# Patient Record
Sex: MALE | Race: WHITE | Employment: UNEMPLOYED | URBAN - NONMETROPOLITAN AREA
[De-identification: names, ages, dates, MRNs, and addresses within clinical notes are randomized per-mention and may not be internally consistent; named-entity substitution may affect disease eponyms.]

---

## 2022-08-31 RX ORDER — GABAPENTIN 100 MG/1
100 CAPSULE ORAL 3 TIMES DAILY
COMMUNITY

## 2022-08-31 RX ORDER — TRAMADOL HYDROCHLORIDE 50 MG/1
50 TABLET ORAL EVERY 6 HOURS PRN
Status: ON HOLD | COMMUNITY
End: 2022-09-09 | Stop reason: HOSPADM

## 2022-08-31 NOTE — FLOWSHEET NOTE
Follow all instructions given by your physician    NPO after midnight   Sips of water am of surgery with allowed medications  Bring insurance info and 's license  Wear comfortable clean, loose fitting clothing  No jewelry or contact lenses to be worn day of surgery  No glue on dentures morning of surgery;you will be asked to remove them for surgery. Case for glasses. Shower night before and morning of surgery with a liquid antibacterial soap, dry with fresh clean towel; no lotions, creams or powder. Clean sheets and pillow case on bed night before surgery  Bring medications in original bottles     needed at discharge and someone over 18 to stay with you for 24 hours overnight (surgery may be cancelled if you don't have this)  [facility] on 2nd [facility]  If you would become ill prior to surgery, please call the surgeon  May have a visitor with you, we request that you limit to 2 visitors in pre-op area  Please bring and wear mask  Call [phone] for any questions  Covid questionnaire Complete; Patient negative for symptoms or exposure. See documentation.

## 2022-09-01 ENCOUNTER — HOSPITAL ENCOUNTER (OUTPATIENT)
Age: 47
Discharge: HOME | End: 2022-09-01
Payer: MEDICAID

## 2022-09-01 ENCOUNTER — HOSPITAL ENCOUNTER (OUTPATIENT)
Dept: GENERAL RADIOLOGY | Age: 47
Discharge: HOME | End: 2022-09-01
Payer: MEDICAID

## 2022-09-01 DIAGNOSIS — Z01.811 PRE-OP CHEST EXAM: ICD-10-CM

## 2022-09-01 LAB
ANION GAP SERPL CALCULATED.3IONS-SCNC: 10 MEQ/L (ref 8–16)
APTT: 33.5 SECONDS (ref 22–38)
BUN BLDV-MCNC: 21 MG/DL (ref 7–22)
CALCIUM SERPL-MCNC: 9 MG/DL (ref 8.5–10.5)
CHLORIDE BLD-SCNC: 108 MEQ/L (ref 98–111)
CO2: 25 MEQ/L (ref 23–33)
CREAT SERPL-MCNC: 0.7 MG/DL (ref 0.4–1.2)
EKG ATRIAL RATE: 71 BPM
EKG P AXIS: 50 DEGREES
EKG P-R INTERVAL: 182 MS
EKG Q-T INTERVAL: 422 MS
EKG QRS DURATION: 96 MS
EKG QTC CALCULATION (BAZETT): 458 MS
EKG R AXIS: 9 DEGREES
EKG T AXIS: 33 DEGREES
EKG VENTRICULAR RATE: 71 BPM
ERYTHROCYTE [DISTWIDTH] IN BLOOD BY AUTOMATED COUNT: 12.9 % (ref 11.5–14.5)
ERYTHROCYTE [DISTWIDTH] IN BLOOD BY AUTOMATED COUNT: 44.9 FL (ref 35–45)
GFR SERPL CREATININE-BSD FRML MDRD: > 90 ML/MIN/1.73M2
GLUCOSE BLD-MCNC: 102 MG/DL (ref 70–108)
HCT VFR BLD CALC: 48.6 % (ref 42–52)
HEMOGLOBIN: 16.4 GM/DL (ref 14–18)
INR BLD: 0.86 (ref 0.85–1.13)
MCH RBC QN AUTO: 31.8 PG (ref 26–33)
MCHC RBC AUTO-ENTMCNC: 33.7 GM/DL (ref 32.2–35.5)
MCV RBC AUTO: 94.4 FL (ref 80–94)
MRSA NASAL SCREEN RT-PCR: NEGATIVE
PLATELET # BLD: 239 THOU/MM3 (ref 130–400)
PMV BLD AUTO: 9.4 FL (ref 9.4–12.4)
POTASSIUM SERPL-SCNC: 4.8 MEQ/L (ref 3.5–5.2)
RBC # BLD: 5.15 MILL/MM3 (ref 4.7–6.1)
SODIUM BLD-SCNC: 143 MEQ/L (ref 135–145)
STAPH AUREUS SCREEN RT-PCR: NEGATIVE
WBC # BLD: 6.5 THOU/MM3 (ref 4.8–10.8)

## 2022-09-01 PROCEDURE — 85730 THROMBOPLASTIN TIME PARTIAL: CPT

## 2022-09-01 PROCEDURE — 93010 ELECTROCARDIOGRAM REPORT: CPT | Performed by: INTERNAL MEDICINE

## 2022-09-01 PROCEDURE — 87641 MR-STAPH DNA AMP PROBE: CPT

## 2022-09-01 PROCEDURE — 85610 PROTHROMBIN TIME: CPT

## 2022-09-01 PROCEDURE — 36415 COLL VENOUS BLD VENIPUNCTURE: CPT

## 2022-09-01 PROCEDURE — 85027 COMPLETE CBC AUTOMATED: CPT

## 2022-09-01 PROCEDURE — 93005 ELECTROCARDIOGRAM TRACING: CPT | Performed by: ORTHOPAEDIC SURGERY

## 2022-09-01 PROCEDURE — 71046 X-RAY EXAM CHEST 2 VIEWS: CPT

## 2022-09-01 PROCEDURE — 87640 STAPH A DNA AMP PROBE: CPT

## 2022-09-01 PROCEDURE — 80048 BASIC METABOLIC PNL TOTAL CA: CPT

## 2022-09-08 NOTE — H&P
palpitations  GASTROINTESTINAL:  negative for nausea, vomiting, incontinence  GENITOURINARY:  negative for frequency and urinary incontinence  MUSCULOSKELETAL:  (+) for back pain, leg pain  NEUROLOGICAL:  (+) for numbness/tingling, negative for headaches  BEHAVIOR/PSYCH:  negative for depressed mood, increased anxiety    PHYSICAL EXAM:    VITAL SIGNS: Ht 6' in, Wt 210 lbs, BMI 28.48.  CONSTITUTIONAL:  Awake, alert, cooperative, no apparent distress, and appears stated age. Antalgic giat  HEAD: Atraumatic, normocephalic  LUNGS:  No respiratory distress. CTA bilaterally. No wheezes, rales, rhonchi  CARDIOVASCULAR:  Regular rate and rhythm, no murmur  ABDOMEN:  Normal bowel sounds, soft, non-distended, non-tender  SPINE: Limited lumbar ROM. Inspection of the spine shows no skin lesions or open wounds. TTP lumbar spine. MUSCULOSKELETAL:  There is no redness, warmth, or swelling of the joints. Full range of motion noted. Motor strength is 5 out of 5 bilateral LE.    NEUROLOGIC:  Awake, alert, oriented to name, place and time. Sensory is intact bilateral LE except for decreased in the left foot.  (+) SLR left side    DATA:    CBC:   Lab Results   Component Value Date/Time    WBC 6.5 09/01/2022 08:41 AM    RBC 5.15 09/01/2022 08:41 AM    HGB 16.4 09/01/2022 08:41 AM    HCT 48.6 09/01/2022 08:41 AM    MCV 94.4 09/01/2022 08:41 AM    MCH 31.8 09/01/2022 08:41 AM    MCHC 33.7 09/01/2022 08:41 AM     09/01/2022 08:41 AM    MPV 9.4 09/01/2022 08:41 AM     WBC:    Lab Results   Component Value Date/Time    WBC 6.5 09/01/2022 08:41 AM     Hemoglobin/Hematocrit:    Lab Results   Component Value Date/Time    HGB 16.4 09/01/2022 08:41 AM    HCT 48.6 09/01/2022 08:41 AM     CMP:    Lab Results   Component Value Date/Time     09/01/2022 08:41 AM    K 4.8 09/01/2022 08:41 AM     09/01/2022 08:41 AM    CO2 25 09/01/2022 08:41 AM    BUN 21 09/01/2022 08:41 AM    CREATININE 0.7 09/01/2022 08:41 AM    LABGLOM >90

## 2022-09-09 ENCOUNTER — APPOINTMENT (OUTPATIENT)
Dept: GENERAL RADIOLOGY | Age: 47
End: 2022-09-09
Attending: ORTHOPAEDIC SURGERY
Payer: MEDICAID

## 2022-09-09 ENCOUNTER — ANESTHESIA EVENT (OUTPATIENT)
Dept: OPERATING ROOM | Age: 47
End: 2022-09-09
Payer: MEDICAID

## 2022-09-09 ENCOUNTER — HOSPITAL ENCOUNTER (OUTPATIENT)
Age: 47
Setting detail: OBSERVATION
Discharge: HOME | End: 2022-09-09
Attending: ORTHOPAEDIC SURGERY | Admitting: ORTHOPAEDIC SURGERY
Payer: MEDICAID

## 2022-09-09 ENCOUNTER — ANESTHESIA (OUTPATIENT)
Dept: OPERATING ROOM | Age: 47
End: 2022-09-09
Payer: MEDICAID

## 2022-09-09 VITALS
RESPIRATION RATE: 18 BRPM | DIASTOLIC BLOOD PRESSURE: 74 MMHG | WEIGHT: 213 LBS | TEMPERATURE: 97.9 F | BODY MASS INDEX: 28.85 KG/M2 | SYSTOLIC BLOOD PRESSURE: 113 MMHG | HEIGHT: 72 IN | OXYGEN SATURATION: 95 % | HEART RATE: 81 BPM

## 2022-09-09 PROBLEM — M48.062 LUMBAR STENOSIS WITH NEUROGENIC CLAUDICATION: Status: ACTIVE | Noted: 2022-09-09

## 2022-09-09 LAB
ABO: NORMAL
ANTIBODY SCREEN: NORMAL
RH FACTOR: NORMAL

## 2022-09-09 PROCEDURE — 3600000004 HC SURGERY LEVEL 4 BASE: Performed by: ORTHOPAEDIC SURGERY

## 2022-09-09 PROCEDURE — 86901 BLOOD TYPING SEROLOGIC RH(D): CPT

## 2022-09-09 PROCEDURE — 7100000010 HC PHASE II RECOVERY - FIRST 15 MIN: Performed by: ORTHOPAEDIC SURGERY

## 2022-09-09 PROCEDURE — 7100000001 HC PACU RECOVERY - ADDTL 15 MIN: Performed by: ORTHOPAEDIC SURGERY

## 2022-09-09 PROCEDURE — 2580000003 HC RX 258: Performed by: PHYSICIAN ASSISTANT

## 2022-09-09 PROCEDURE — 3600000014 HC SURGERY LEVEL 4 ADDTL 15MIN: Performed by: ORTHOPAEDIC SURGERY

## 2022-09-09 PROCEDURE — 2709999900 HC NON-CHARGEABLE SUPPLY: Performed by: ORTHOPAEDIC SURGERY

## 2022-09-09 PROCEDURE — 7100000011 HC PHASE II RECOVERY - ADDTL 15 MIN: Performed by: ORTHOPAEDIC SURGERY

## 2022-09-09 PROCEDURE — 36415 COLL VENOUS BLD VENIPUNCTURE: CPT

## 2022-09-09 PROCEDURE — 6360000002 HC RX W HCPCS: Performed by: ORTHOPAEDIC SURGERY

## 2022-09-09 PROCEDURE — 2500000003 HC RX 250 WO HCPCS

## 2022-09-09 PROCEDURE — 86850 RBC ANTIBODY SCREEN: CPT

## 2022-09-09 PROCEDURE — 6360000002 HC RX W HCPCS: Performed by: PHYSICIAN ASSISTANT

## 2022-09-09 PROCEDURE — 3700000001 HC ADD 15 MINUTES (ANESTHESIA): Performed by: ORTHOPAEDIC SURGERY

## 2022-09-09 PROCEDURE — 7100000000 HC PACU RECOVERY - FIRST 15 MIN: Performed by: ORTHOPAEDIC SURGERY

## 2022-09-09 PROCEDURE — 86900 BLOOD TYPING SEROLOGIC ABO: CPT

## 2022-09-09 PROCEDURE — 6360000002 HC RX W HCPCS

## 2022-09-09 PROCEDURE — G0378 HOSPITAL OBSERVATION PER HR: HCPCS

## 2022-09-09 PROCEDURE — 3700000000 HC ANESTHESIA ATTENDED CARE: Performed by: ORTHOPAEDIC SURGERY

## 2022-09-09 PROCEDURE — 2500000003 HC RX 250 WO HCPCS: Performed by: ORTHOPAEDIC SURGERY

## 2022-09-09 PROCEDURE — 72020 X-RAY EXAM OF SPINE 1 VIEW: CPT

## 2022-09-09 PROCEDURE — 2720000010 HC SURG SUPPLY STERILE: Performed by: ORTHOPAEDIC SURGERY

## 2022-09-09 PROCEDURE — 6370000000 HC RX 637 (ALT 250 FOR IP): Performed by: PHYSICIAN ASSISTANT

## 2022-09-09 RX ORDER — FENTANYL CITRATE 50 UG/ML
50 INJECTION, SOLUTION INTRAMUSCULAR; INTRAVENOUS EVERY 5 MIN PRN
Status: DISCONTINUED | OUTPATIENT
Start: 2022-09-09 | End: 2022-09-09 | Stop reason: HOSPADM

## 2022-09-09 RX ORDER — VANCOMYCIN HYDROCHLORIDE 1 G/20ML
INJECTION, POWDER, LYOPHILIZED, FOR SOLUTION INTRAVENOUS PRN
Status: DISCONTINUED | OUTPATIENT
Start: 2022-09-09 | End: 2022-09-09 | Stop reason: ALTCHOICE

## 2022-09-09 RX ORDER — SODIUM CHLORIDE 9 MG/ML
INJECTION, SOLUTION INTRAVENOUS PRN
Status: DISCONTINUED | OUTPATIENT
Start: 2022-09-09 | End: 2022-09-09 | Stop reason: HOSPADM

## 2022-09-09 RX ORDER — LABETALOL 20 MG/4 ML (5 MG/ML) INTRAVENOUS SYRINGE
10
Status: DISCONTINUED | OUTPATIENT
Start: 2022-09-09 | End: 2022-09-09 | Stop reason: HOSPADM

## 2022-09-09 RX ORDER — ONDANSETRON 2 MG/ML
INJECTION INTRAMUSCULAR; INTRAVENOUS PRN
Status: DISCONTINUED | OUTPATIENT
Start: 2022-09-09 | End: 2022-09-09 | Stop reason: SDUPTHER

## 2022-09-09 RX ORDER — FENTANYL CITRATE 50 UG/ML
INJECTION, SOLUTION INTRAMUSCULAR; INTRAVENOUS PRN
Status: DISCONTINUED | OUTPATIENT
Start: 2022-09-09 | End: 2022-09-09 | Stop reason: SDUPTHER

## 2022-09-09 RX ORDER — MIDAZOLAM HYDROCHLORIDE 1 MG/ML
INJECTION INTRAMUSCULAR; INTRAVENOUS PRN
Status: DISCONTINUED | OUTPATIENT
Start: 2022-09-09 | End: 2022-09-09 | Stop reason: SDUPTHER

## 2022-09-09 RX ORDER — BUPIVACAINE HYDROCHLORIDE 5 MG/ML
INJECTION, SOLUTION PERINEURAL PRN
Status: DISCONTINUED | OUTPATIENT
Start: 2022-09-09 | End: 2022-09-09 | Stop reason: ALTCHOICE

## 2022-09-09 RX ORDER — SODIUM CHLORIDE 0.9 % (FLUSH) 0.9 %
5-40 SYRINGE (ML) INJECTION PRN
Status: DISCONTINUED | OUTPATIENT
Start: 2022-09-09 | End: 2022-09-09 | Stop reason: HOSPADM

## 2022-09-09 RX ORDER — OXYCODONE HYDROCHLORIDE AND ACETAMINOPHEN 5; 325 MG/1; MG/1
1 TABLET ORAL ONCE
Status: COMPLETED | OUTPATIENT
Start: 2022-09-09 | End: 2022-09-09

## 2022-09-09 RX ORDER — ROCURONIUM BROMIDE 10 MG/ML
INJECTION, SOLUTION INTRAVENOUS PRN
Status: DISCONTINUED | OUTPATIENT
Start: 2022-09-09 | End: 2022-09-09 | Stop reason: SDUPTHER

## 2022-09-09 RX ORDER — SODIUM CHLORIDE 0.9 % (FLUSH) 0.9 %
5-40 SYRINGE (ML) INJECTION EVERY 12 HOURS SCHEDULED
Status: DISCONTINUED | OUTPATIENT
Start: 2022-09-09 | End: 2022-09-09 | Stop reason: HOSPADM

## 2022-09-09 RX ORDER — DEXAMETHASONE SODIUM PHOSPHATE 10 MG/ML
INJECTION, EMULSION INTRAMUSCULAR; INTRAVENOUS PRN
Status: DISCONTINUED | OUTPATIENT
Start: 2022-09-09 | End: 2022-09-09 | Stop reason: SDUPTHER

## 2022-09-09 RX ORDER — SODIUM CHLORIDE 9 MG/ML
INJECTION, SOLUTION INTRAVENOUS CONTINUOUS
Status: DISCONTINUED | OUTPATIENT
Start: 2022-09-09 | End: 2022-09-09 | Stop reason: HOSPADM

## 2022-09-09 RX ORDER — SUCCINYLCHOLINE CHLORIDE 20 MG/ML
INJECTION INTRAMUSCULAR; INTRAVENOUS PRN
Status: DISCONTINUED | OUTPATIENT
Start: 2022-09-09 | End: 2022-09-09 | Stop reason: SDUPTHER

## 2022-09-09 RX ORDER — LIDOCAINE HYDROCHLORIDE 20 MG/ML
INJECTION, SOLUTION EPIDURAL; INFILTRATION; INTRACAUDAL; PERINEURAL PRN
Status: DISCONTINUED | OUTPATIENT
Start: 2022-09-09 | End: 2022-09-09 | Stop reason: SDUPTHER

## 2022-09-09 RX ORDER — HYDRALAZINE HYDROCHLORIDE 20 MG/ML
10 INJECTION INTRAMUSCULAR; INTRAVENOUS
Status: DISCONTINUED | OUTPATIENT
Start: 2022-09-09 | End: 2022-09-09 | Stop reason: HOSPADM

## 2022-09-09 RX ORDER — PROPOFOL 10 MG/ML
INJECTION, EMULSION INTRAVENOUS PRN
Status: DISCONTINUED | OUTPATIENT
Start: 2022-09-09 | End: 2022-09-09 | Stop reason: SDUPTHER

## 2022-09-09 RX ORDER — FENTANYL CITRATE 50 UG/ML
INJECTION, SOLUTION INTRAMUSCULAR; INTRAVENOUS
Status: COMPLETED
Start: 2022-09-09 | End: 2022-09-09

## 2022-09-09 RX ADMIN — SODIUM CHLORIDE: 9 INJECTION, SOLUTION INTRAVENOUS at 07:04

## 2022-09-09 RX ADMIN — SUGAMMADEX 200 MG: 100 INJECTION, SOLUTION INTRAVENOUS at 10:08

## 2022-09-09 RX ADMIN — FENTANYL CITRATE 100 MCG: 50 INJECTION, SOLUTION INTRAMUSCULAR; INTRAVENOUS at 08:46

## 2022-09-09 RX ADMIN — FENTANYL CITRATE 50 MCG: 50 INJECTION, SOLUTION INTRAMUSCULAR; INTRAVENOUS at 10:38

## 2022-09-09 RX ADMIN — ONDANSETRON 4 MG: 2 INJECTION INTRAMUSCULAR; INTRAVENOUS at 09:55

## 2022-09-09 RX ADMIN — SODIUM CHLORIDE: 9 INJECTION, SOLUTION INTRAVENOUS at 09:48

## 2022-09-09 RX ADMIN — OXYCODONE AND ACETAMINOPHEN 1 TABLET: 5; 325 TABLET ORAL at 12:05

## 2022-09-09 RX ADMIN — ROCURONIUM BROMIDE 30 MG: 10 INJECTION INTRAVENOUS at 09:01

## 2022-09-09 RX ADMIN — MIDAZOLAM 2 MG: 1 INJECTION INTRAMUSCULAR; INTRAVENOUS at 08:44

## 2022-09-09 RX ADMIN — ROCURONIUM BROMIDE 20 MG: 10 INJECTION INTRAVENOUS at 09:20

## 2022-09-09 RX ADMIN — DEXAMETHASONE SODIUM PHOSPHATE 10 MG: 10 INJECTION, EMULSION INTRAMUSCULAR; INTRAVENOUS at 08:52

## 2022-09-09 RX ADMIN — FENTANYL CITRATE 50 MCG: 50 INJECTION, SOLUTION INTRAMUSCULAR; INTRAVENOUS at 10:08

## 2022-09-09 RX ADMIN — PROPOFOL 170 MG: 10 INJECTION, EMULSION INTRAVENOUS at 08:49

## 2022-09-09 RX ADMIN — CEFAZOLIN 2000 MG: 10 INJECTION, POWDER, FOR SOLUTION INTRAVENOUS at 09:01

## 2022-09-09 RX ADMIN — Medication 140 MG: at 08:49

## 2022-09-09 RX ADMIN — LIDOCAINE HYDROCHLORIDE 100 MG: 20 INJECTION, SOLUTION EPIDURAL; INFILTRATION; INTRACAUDAL; PERINEURAL at 08:49

## 2022-09-09 ASSESSMENT — PAIN DESCRIPTION - PAIN TYPE
TYPE: SURGICAL PAIN

## 2022-09-09 ASSESSMENT — PAIN DESCRIPTION - DESCRIPTORS: DESCRIPTORS: SHARP

## 2022-09-09 ASSESSMENT — PAIN SCALES - GENERAL
PAINLEVEL_OUTOF10: 7
PAINLEVEL_OUTOF10: 0
PAINLEVEL_OUTOF10: 5
PAINLEVEL_OUTOF10: 5

## 2022-09-09 ASSESSMENT — PAIN DESCRIPTION - LOCATION
LOCATION: BACK

## 2022-09-09 ASSESSMENT — PAIN DESCRIPTION - ONSET: ONSET: ON-GOING

## 2022-09-09 ASSESSMENT — PAIN - FUNCTIONAL ASSESSMENT
PAIN_FUNCTIONAL_ASSESSMENT: ACTIVITIES ARE NOT PREVENTED
PAIN_FUNCTIONAL_ASSESSMENT: 0-10

## 2022-09-09 ASSESSMENT — LIFESTYLE VARIABLES: SMOKING_STATUS: 1

## 2022-09-09 ASSESSMENT — PAIN DESCRIPTION - ORIENTATION: ORIENTATION: LOWER

## 2022-09-09 ASSESSMENT — PAIN DESCRIPTION - FREQUENCY: FREQUENCY: CONTINUOUS

## 2022-09-09 NOTE — ANESTHESIA PRE PROCEDURE
Department of Anesthesiology  Preprocedure Note       Name:  Pamella Redman   Age:  55 y.o.  :  1975                                          MRN:  [id number]         Date:  2022      Surgeon: Minh Kumari):  Enzo Everett MD    Procedure: Procedure(s):  L5-S1 DECOMPRESSION AND LEFT MICRODISCECTOMY    Medications prior to admission:   Prior to Admission medications    Medication Sig Start Date End Date Taking? Authorizing Provider   gabapentin (NEURONTIN) 100 MG capsule Take 100 mg by mouth 3 times daily. Yes Historical Provider, MD   traMADol (ULTRAM) 50 MG tablet Take 50 mg by mouth every 6 hours as needed for Pain.    Yes Historical Provider, MD       Current medications:    Current Facility-Administered Medications   Medication Dose Route Frequency Provider Last Rate Last Admin    0.9 % sodium chloride infusion   IntraVENous Continuous Arun Diglio,  mL/hr at 22 0704 Restarted at 22 0844    sodium chloride flush 0.9 % injection 5-40 mL  5-40 mL IntraVENous 2 times per day [place], PA        sodium chloride flush 0.9 % injection 5-40 mL  5-40 mL IntraVENous PRN Arun Diglio, PA        0.9 % sodium chloride infusion   IntraVENous PRN [place], PA         Facility-Administered Medications Ordered in Other Encounters   Medication Dose Route Frequency Provider Last Rate Last Admin    dexamethasone (PF) (DECADRON) injection   IntraVENous PRN Lenice Chime, APRN - CRNA   10 mg at 22 6436    propofol injection   IntraVENous PRN Lenice Chime, APRN - CRNA   170 mg at 22 0849    rocuronium (ZEMURON) injection   IntraVENous PRN Lenice Chime, APRN - CRNA   30 mg at 22 0901    succinylcholine (ANECTINE) injection   IntraVENous PRN Lenice Chime, APRN - CRNA   140 mg at 22 0849    lidocaine PF 2 % injection   IntraVENous PRN Lenice Chime, APRN - CRNA   100 mg at 22 0849    fentaNYL (SUBLIMAZE) injection   IntraVENous PRN Nick Granger Ebenezer Leroy - CRNA   100 mcg at 09/09/22 0846    midazolam (VERSED) injection   IntraVENous PRN Serene Dus, APRN - CRNA   2 mg at 09/09/22 0844       Allergies:  No Known Allergies    Problem List:    Patient Active Problem List   Diagnosis Code    Lumbar stenosis with neurogenic claudication M48.062       Past Medical History:  History reviewed. No pertinent past medical history. Past Surgical History:  History reviewed. No pertinent surgical history. Social History:    Social History     Tobacco Use    Smoking status: Every Day     Packs/day: 2.00     Types: Cigarettes    Smokeless tobacco: Never   Substance Use Topics    Alcohol use: Not Currently                                Ready to quit: Not Answered  Counseling given: Not Answered      Vital Signs (Current):   Vitals:    08/31/22 1306 09/09/22 0608   BP:  126/71   Pulse:  77   Resp:  20   Temp:  97.3 °F (36.3 °C)   TempSrc:  Tympanic   SpO2:  96%   Weight: 210 lb (95.3 kg) 213 lb (96.6 kg)   Height: 6' (1.829 m)                                               BP Readings from Last 3 Encounters:   09/09/22 126/71       NPO Status: Time of last liquid consumption: 1900                        Time of last solid consumption: 1900                        Date of last liquid consumption: 09/08/22                        Date of last solid food consumption: 09/08/22    BMI:   Wt Readings from Last 3 Encounters:   09/09/22 213 lb (96.6 kg)     Body mass index is 28.89 kg/m².     CBC:   Lab Results   Component Value Date/Time    WBC 6.5 09/01/2022 08:41 AM    RBC 5.15 09/01/2022 08:41 AM    HGB 16.4 09/01/2022 08:41 AM    HCT 48.6 09/01/2022 08:41 AM    MCV 94.4 09/01/2022 08:41 AM     09/01/2022 08:41 AM       CMP:   Lab Results   Component Value Date/Time     09/01/2022 08:41 AM    K 4.8 09/01/2022 08:41 AM     09/01/2022 08:41 AM    CO2 25 09/01/2022 08:41 AM    BUN 21 09/01/2022 08:41 AM    CREATININE 0.7 09/01/2022 08:41 AM LABGLOM >90 09/01/2022 08:41 AM    GLUCOSE 102 09/01/2022 08:41 AM    CALCIUM 9.0 09/01/2022 08:41 AM       POC Tests: No results for input(s): POCGLU, POCNA, POCK, POCCL, POCBUN, POCHEMO, POCHCT in the last 72 hours. Coags:   Lab Results   Component Value Date/Time    INR 0.86 09/01/2022 08:41 AM    APTT 33.5 09/01/2022 08:41 AM       HCG (If Applicable): No results found for: PREGTESTUR, PREGSERUM, HCG, HCGQUANT     ABGs: No results found for: PHART, PO2ART, LQA7NWO, HEP0MSZ, BEART, B6ZMJXSM     Type & Screen (If Applicable):  Lab Results   Component Value Date    LABRH POS 09/09/2022       Drug/Infectious Status (If Applicable):  No results found for: HIV, HEPCAB    COVID-19 Screening (If Applicable): No results found for: COVID19        Anesthesia Evaluation  Patient summary reviewed  Airway: Mallampati: II  TM distance: >3 FB   Neck ROM: full  Mouth opening: > = 3 FB   Dental:    (+) poor dentition      Pulmonary:   (+) current smoker          Patient smoked on day of surgery. Cardiovascular:                      Neuro/Psych:               GI/Hepatic/Renal:             Endo/Other:                     Abdominal:             Vascular: Other Findings:           Anesthesia Plan      general     ASA 2       Induction: intravenous. MIPS: Postoperative opioids intended and Prophylactic antiemetics administered. Anesthetic plan and risks discussed with patient and spouse. Plan discussed with CRNA. Xin Art.  [facility]   9/9/2022

## 2022-09-09 NOTE — DISCHARGE INSTRUCTIONS
For the next 2 weeks, limit bending and twisting of the spine and do not lift > 10-15 lbs. After  2 weeks, gradually ease off restrictions.

## 2022-09-09 NOTE — PROGRESS NOTES
Pt returned to [facility] room 6. Vitals and assessment as charted. 0.9 infusing from PACU. Pt has crackers and water. Family at the bedside. Pt and family verbalized understanding of discharge criteria and call light use. Call light in reach.

## 2022-09-09 NOTE — PROGRESS NOTES
Pt has met discharge criteria and states he is ready for discharge to home. IV removed, gauze and tape applied. Dressed in own clothes and personal belongings gathered. Discharge instructions (with opioid medication education information) given to pt and family; pt and family verbalized understanding of discharge instructions, prescriptions and follow up appointments. Pt transported to discharge lobby by [facility] staff.

## 2022-09-09 NOTE — PROGRESS NOTES
1019 Patient to PACU  Alert to name. Respirations easy and unlabored. Denies having any pain at this time. 1030 Patient alert and oriented x4. States pain is uncomfortable, but tolerable. VSS. 1038 Patient stating pain 7/10 at this time, medicated with 50 mcg Fentanyl. 1043 Patient stating pain more tolerable at this time. Respirations easy and unlabored. VSS. 1050 Patient resting with eyes closed. VSS. 1054 Patient states pain tolerable at this time. VSS    1058 Patient meets criteria for discharge from PACU.      1100 Patient transported to [facility] in stable condition.       1105 Report given to [facility]

## 2022-09-09 NOTE — BRIEF OP NOTE
Brief Postoperative Note      Patient: Latesha Newby  YOB: 1975  MRN: [id number]    Date of Procedure: 9/9/2022    Pre-Op Diagnosis: L5-S1 disc herniation with stenosis and radiculopathy    Post-Op Diagnosis: Same       Procedure: L5-S1 left microdiscectomy    Surgeon(s):  Jeanne Souza MD    Assistant:  Physician Assistant: Reginald Fernandez.  EUGENIO Morgan    Anesthesia: General    Estimated Blood Loss (mL): 50 ml    Complications: None    Specimens:   * No specimens in log *    Implants:  * No implants in log *      Drains: * No LDAs found *    Findings: leftward disc herniation    Electronically signed by Americo Terrazas PA-C on 9/9/2022 at 9:54 AM

## 2022-09-09 NOTE — ANESTHESIA POSTPROCEDURE EVALUATION
Department of Anesthesiology  Postprocedure Note    Patient: Acosta Darby  MRN: [id number]  YOB: 1975  Date of evaluation: 9/9/2022      Procedure Summary     Date: 09/09/22 Room / Location: [facility]  Shelby Catalan    Anesthesia Start: 0092 Anesthesia Stop: 0999    Procedure: L5-S1 DECOMPRESSION AND LEFT MICRODISCECTOMY (Spine Lumbar) Diagnosis:       Displacement of intervertebral disc of lumbosacral region      (Displacement of intervertebral disc of lumbosacral region [M51.27])    Surgeons: Juliet Diallo MD Responsible Provider: Diane Catalan DO    Anesthesia Type: general ASA Status: 2          Anesthesia Type: No value filed.     Shani Phase I: Shani Score: 10    Shani Phase II: Shani Score: 10      Anesthesia Post Evaluation    Patient location during evaluation: bedside  Patient participation: complete - patient participated  Level of consciousness: awake  Airway patency: patent  Nausea & Vomiting: no vomiting and no nausea  Cardiovascular status: hemodynamically stable  Respiratory status: acceptable  Hydration status: stable

## 2022-09-09 NOTE — PROGRESS NOTES
Pt admitted to [facility] room 6 and oriented to unit. SCD sleeves applied. Nares swabbed. Pt verbalized permission for first name, last initial and physicians name on white board. SDS board and discharge criteria explained, pt and family verbalized understanding. Pt denies thoughts of harming self or others. Call light in reach. Family at the bedside.

## 2022-09-12 NOTE — DISCHARGE SUMMARY
Discharge Summary        Date of Admission: 9/9/22  Date of Discharge: 9/9/22    Admitting Physician: Roque Grace MD  Consults: None     DATE OF PROCEDURE: 09/09/2022     PREOPERATIVE DIAGNOSES:  1. L5-S1 herniated nucleus pulposus  2. L5-S1 lumbar stenosis with radiculopathy     POSTOPERATIVE DIAGNOSES:  1. L5-S1 herniated nucleus pulposus  2. L5-S1 lumbar stenosis with radiculopathy     OPERATION PERFORMED:   1. Left L5-S1 microdiscectomy     SURGEON:  Iliana Blood M.D. INDICATIONS:  This is a 55 y.o. male with refractory back and left leg pain in a S1 distribution and numbness in the foot from lumbar stenosis and a large disc herniation at L5-S1. Patient has tried failed conservative therapy including medication management and physician directed home exercises. Due to the persistence of symptoms and failure of conservative treatment, patient elected to proceed with surgical treatment. Patient, therefore, understood the indication for the surgery as well as its risks, benefits, and alternatives. These risks include, but are not limited to paralysis, infection, hematoma, dural tear, nerve root injury, reherniation requiring fusion, DVT/PE, stroke, MI, death etc.  All questions were answered and informed consent was obtained. HOSPITAL COURSE:   The patient was admitted on the above date had the above procedure performed. Patient had no complications during surgery. The patient was then safely discharged to home on the above date. PHYSICAL EXAMINATION ON DISCHARGE:   The patient was afebrile, stable. Dressing was clean, dry and intact. 5/5 strength in bilateral lower extremities. Neurologically intact. DISCHARGE INSTRUCTIONS:   Patient can resume regular diet. Resume home medications except for NSAIDs or blood thinners. Resume asa POD#2 and all other blood thinners POD#5. Okay to resume NSAIDs 6 months after surgery.  Take previously prescribed narcotic pain medications as directed. Hold off on showering or dressing changes until hemovac drain is removed. Change the dressing daily until the incision is clean and dry and then leave open to air. Okay to take a shower without submerging the incision. Wear the brace at all times when up and ambulating. Limit any heavy lifting, bending or twisting until first postoperative visit. Patient to follow up with Dr. Lay Solares 6 weeks post discharge as scheduled.     CONDITION AT DISCHARGE:  Stable    DISPOSITION:   Home with [facility] for drain management     Anel Rod PA-C

## 2022-09-13 NOTE — OP NOTE
Operative Note      Patient: Leena Crespo  YOB: 1975  MRN: [id number]                       Account #: [de-identified]                               Admission Date: 09/09/2022    Provider:  Nikole Gary M.D.    DATE OF PROCEDURE: 09/09/2022     PREOPERATIVE DIAGNOSES:  1. L5-S1 herniated nucleus pulposus  2. L5-S1 lumbar stenosis with radiculopathy    POSTOPERATIVE DIAGNOSES:  1. L5-S1 herniated nucleus pulposus  2. L5-S1 lumbar stenosis with radiculopathy     OPERATION PERFORMED:   1. Left L5-S1 microdiscectomy     SURGEON:  Iliana Gary M.D.     ASSISTANT:  EUGENIO Bartlett, [facility], assisted throughout the procedure with positioning, draping, retraction, wound closure, and dressing application     ANESTHESIA:  General.     INDICATIONS:  This is a 55 y.o. male with refractory back and left leg pain in a S1 distribution and numbness in the foot from lumbar stenosis and a large disc herniation at L5-S1. Patient has tried failed conservative therapy including medication management and physician directed home exercises. Due to the persistence of symptoms and failure of conservative treatment, patient elected to proceed with surgical treatment. Patient, therefore, understood the indication for the surgery as well as its risks, benefits, and alternatives. These risks include, but are not limited to paralysis, infection, hematoma, dural tear, nerve root injury, reherniation requiring fusion, DVT/PE, stroke, MI, death etc.  All questions were answered and informed consent was obtained. DESCRIPTION OF PROCEDURE:  The patient was taken to the operating room by Anesthesiology Service and had satisfactory general anesthesia. A first-generation cephalosporin was given within one hour of surgical incision, 2 gm of cefazolin were given IV. Venous thromboembolic prophylaxis was performed with sequential devices.   The patient was then positioned prone on a standard OSI frame with the abdomen hanging free and all bony prominences well padded. The low back was then prepped and draped in its entirety in the usual sterile fashion. Before incision, a formal timeout was taken per protocol. We next took a left-sided approach and performed subperiosteal dissection out to the medial facet joint at L5-S1. Intraoperative fluoroscopy was used to confirm level. Satisfied with exposure and confirmation of level, we then began the superior laminotomy of S1 and inferior laminotomy of L5. We then entered the spinal canal, resected ligamentum flavor in its entirety over this region. We then performed partial medial facetectomy to get lateral to the facet overhang, but just medial to the pedicles and nerve roots. Care was taken to preserve stability of the pars and the facet joint. We then mobilized the left S1 nerve root over a large contained disc herniation. Hemostasis was achieved over the disc space. Variety of curettes and pituitary was used to remove the large contained disc herniation. Once this was done, we palpated medially and there were no other loose fragments to be removed. There was excellent excursion of the left S1 nerve root both medially and laterally without tension. This thereby completed our microdiscectomy at L5-S1 on the left. Satisfied with this, we then achieved hemostasis. We then copiously irrigated the wound. The wound was then closed in layers with interrupted Vicryl sutures and 2 g vancomycin power. A 3-0 Monocryl was used for the skin. The skin edges were sealed with Dermabond. A dry sterile dressing was applied. The patient was then returned to the hospital bed, extubated, and taken to the recovery room in stable condition. Specimens:   * No specimens in log *    Implants:  * No implants in log *      COMPLICATIONS:  None. SPECIMENS:  None. ESTIMATED BLOOD LOSS:  50 mL.      POSTOPERATIVE CARE:  The patient will be recovered in PACU and then discharged home per clinical indication. Patient will follow up in the office in six weeks.       Electronically signed by Iman Jon MD on 9/12/2022 at 8:10 PM

## 2024-02-27 NOTE — PROGRESS NOTES
PAT VISIT  Appointment reminder call given  Date: 3/7  Arrival time: 10:30 and location; [facility]   Bring Drivers license and insurance  Bring Medications in original bottles  Please be ready to give Urine Sample  If possible bring caregiver for appointment  Take am medications with water unless you are holding any for surgery  Appointment may last 2 hours

## 2024-03-07 ENCOUNTER — HOSPITAL ENCOUNTER (OUTPATIENT)
Dept: PREADMISSION TESTING | Age: 49
Discharge: HOME | End: 2024-03-07
Payer: MEDICAID

## 2024-03-07 ENCOUNTER — HOSPITAL ENCOUNTER (OUTPATIENT)
Dept: GENERAL RADIOLOGY | Age: 49
Discharge: HOME | End: 2024-03-07
Payer: MEDICAID

## 2024-03-07 VITALS
WEIGHT: 217.59 LBS | DIASTOLIC BLOOD PRESSURE: 76 MMHG | RESPIRATION RATE: 18 BRPM | TEMPERATURE: 98.7 F | SYSTOLIC BLOOD PRESSURE: 124 MMHG | OXYGEN SATURATION: 98 % | HEIGHT: 72 IN | HEART RATE: 66 BPM | BODY MASS INDEX: 29.47 KG/M2

## 2024-03-07 DIAGNOSIS — Z01.811 PRE-OP CHEST EXAM: ICD-10-CM

## 2024-03-07 LAB
APTT PPP: 34.7 SECONDS (ref 22–38)
BUN SERPL-MCNC: 13 MG/DL (ref 7–22)
CALCIUM SERPL-MCNC: 8.6 MG/DL (ref 8.5–10.5)
CHLORIDE SERPL-SCNC: 104 MEQ/L (ref 98–111)
CO2 SERPL-SCNC: 23 MEQ/L (ref 23–33)
CREAT SERPL-MCNC: 0.7 MG/DL (ref 0.4–1.2)
DEPRECATED RDW RBC AUTO: 42.4 FL (ref 35–45)
EKG ATRIAL RATE: 65 BPM
EKG P AXIS: 52 DEGREES
EKG P-R INTERVAL: 192 MS
EKG Q-T INTERVAL: 424 MS
EKG QRS DURATION: 102 MS
EKG QTC CALCULATION (BAZETT): 440 MS
EKG R AXIS: -9 DEGREES
EKG T AXIS: 49 DEGREES
EKG VENTRICULAR RATE: 65 BPM
ERYTHROCYTE [DISTWIDTH] IN BLOOD BY AUTOMATED COUNT: 12.1 % (ref 11.5–14.5)
GFR SERPL CREATININE-BSD FRML MDRD: > 60 ML/MIN/1.73M2
GLUCOSE SERPL-MCNC: 106 MG/DL (ref 70–108)
HCT VFR BLD AUTO: 48.6 % (ref 42–52)
HGB BLD-MCNC: 16.3 GM/DL (ref 14–18)
INR PPP: 0.95 (ref 0.85–1.13)
MCH RBC QN AUTO: 32 PG (ref 26–33)
MCHC RBC AUTO-ENTMCNC: 33.5 GM/DL (ref 32.2–35.5)
MCV RBC AUTO: 95.5 FL (ref 80–94)
MRSA DNA SPEC QL NAA+PROBE: NEGATIVE
PLATELET # BLD AUTO: 273 THOU/MM3 (ref 130–400)
PMV BLD AUTO: 9.5 FL (ref 9.4–12.4)
POTASSIUM SERPL-SCNC: 4 MEQ/L (ref 3.5–5.2)
RBC # BLD AUTO: 5.09 MILL/MM3 (ref 4.7–6.1)
SODIUM SERPL-SCNC: 138 MEQ/L (ref 135–145)
WBC # BLD AUTO: 6.4 THOU/MM3 (ref 4.8–10.8)

## 2024-03-07 PROCEDURE — 85027 COMPLETE CBC AUTOMATED: CPT

## 2024-03-07 PROCEDURE — 80048 BASIC METABOLIC PNL TOTAL CA: CPT

## 2024-03-07 PROCEDURE — 93005 ELECTROCARDIOGRAM TRACING: CPT | Performed by: ORTHOPAEDIC SURGERY

## 2024-03-07 PROCEDURE — 87641 MR-STAPH DNA AMP PROBE: CPT

## 2024-03-07 PROCEDURE — 85610 PROTHROMBIN TIME: CPT

## 2024-03-07 PROCEDURE — 71046 X-RAY EXAM CHEST 2 VIEWS: CPT

## 2024-03-07 PROCEDURE — 36415 COLL VENOUS BLD VENIPUNCTURE: CPT

## 2024-03-07 PROCEDURE — 93010 ELECTROCARDIOGRAM REPORT: CPT | Performed by: NUCLEAR MEDICINE

## 2024-03-07 PROCEDURE — 85730 THROMBOPLASTIN TIME PARTIAL: CPT

## 2024-03-07 RX ORDER — ACETAMINOPHEN 325 MG/1
325 TABLET ORAL EVERY 6 HOURS PRN
COMMUNITY

## 2024-03-07 ASSESSMENT — PAIN - FUNCTIONAL ASSESSMENT: PAIN_FUNCTIONAL_ASSESSMENT: PREVENTS OR INTERFERES SOME ACTIVE ACTIVITIES AND ADLS

## 2024-03-07 ASSESSMENT — PAIN DESCRIPTION - ORIENTATION: ORIENTATION: LEFT

## 2024-03-07 ASSESSMENT — PAIN DESCRIPTION - DESCRIPTORS: DESCRIPTORS: ACHING;SHARP;SHOOTING;NUMBNESS

## 2024-03-07 ASSESSMENT — PAIN SCALES - GENERAL: PAINLEVEL_OUTOF10: 10

## 2024-03-07 ASSESSMENT — PAIN DESCRIPTION - LOCATION: LOCATION: BACK;LEG

## 2024-03-07 ASSESSMENT — PAIN DESCRIPTION - FREQUENCY: FREQUENCY: CONTINUOUS

## 2024-03-07 NOTE — PROGRESS NOTES
Preliminary Discharge Planning Questionnaire  Date of Surgery 3-19-24   Surgeon st malik      Having the proper help and care after surgery is very important to your recovery. Who will be able to help you at home when you are discharged from the hospital?    significant other    Name(s) and 2 dogs and 2 cats    How many steps to enter your home?  0    Bathroom on first floor?  Yes    Bedroom on the first floor?  Yes    Do you have an elevated toilet seat to use at home?  No    Do you have a walker to use at home?    Total Joints - with wheels No   Spine - with wheels  No     Have you been doing home exercises?no    *You will go home with some outpatient physical therapy, where do you prefer to go? [facility] in [facility]    This typically will not start until after your post visit to your Dr. (3-4 weeks after surgery)    * What home health agency would you like to use?[facility]       List of all Home Health Agencies Available given to patient, with website ( per Social Work Team)      Please have 2 preferences when you come for surgery- 1st and 2nd choice

## 2024-03-07 NOTE — FLOWSHEET NOTE
Pt is having pain in lower back that is constant.  It is 10/10 today.  Described as \"pain going down left leg feet feels numb      Pain scale and pain management review with patient.

## 2024-03-07 NOTE — FLOWSHEET NOTE
Lumbar Spine Surgery Pre-op Instructions  Nothing to eat or drink after midnight except sips of water to take medications, this includes no mints, chewing gum or ice chips  No Smoking or chewing tobacco 24 hours before surgery  Bring your medications in the original bottles   Bring photo ID and any health insurance cards  Wear comfortable clean loose fitting clothing  Do not wear any jewelry, make up, body piercings or contact lenses  Bring your walker  Bring your back brace if you were given one  Bring your CPAP machine if you have one no doesn't have it no pacemaker   Wear clean clothes to bed and place fresh clean sheets and pillowcases on your bed the night before surgery  Patient viewed physical therapy video  Routine preop instructions given for pain, hand hygiene, fall prevention, infection prevention, anesthesia, cough and deep breath, and progressive diet and ambulation  Showering:  Shower 2 days before, day before and morning of surgery using the StartClean® kit provided (follow the instructions provided with the kit).   Do not shave the surgical area! If needed, your nurse will use clippers to remove any excess hair at the surgical site when you come in for surgery. Do not use a razor on the site of your surgery for at least 2 days prior to surgery because shaving can leave tiny nicks in the skin which may allow germs to enter and cause infection.  Perform the exercises given in your booklet 3 times daily starting today  Please have 2 Home Health Agencies in mind for post op care; 1st choice and 2nd choice.   Remember- Post Op NO BLTS ; No Bending, No Lifting, No Twisting until Dr say its ok.
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
## 2024-03-19 ENCOUNTER — ANESTHESIA EVENT (OUTPATIENT)
Dept: OPERATING ROOM | Age: 49
End: 2024-03-19
Payer: MEDICAID

## 2024-03-19 ENCOUNTER — ANESTHESIA (OUTPATIENT)
Dept: OPERATING ROOM | Age: 49
End: 2024-03-19
Payer: MEDICAID

## 2024-03-19 ENCOUNTER — APPOINTMENT (OUTPATIENT)
Dept: GENERAL RADIOLOGY | Age: 49
DRG: 304 | End: 2024-03-19
Attending: ORTHOPAEDIC SURGERY
Payer: MEDICAID

## 2024-03-19 ENCOUNTER — HOSPITAL ENCOUNTER (INPATIENT)
Age: 49
LOS: 1 days | Discharge: HOME | DRG: 304 | End: 2024-03-21
Attending: ORTHOPAEDIC SURGERY | Admitting: ORTHOPAEDIC SURGERY
Payer: MEDICAID

## 2024-03-19 LAB
ABO: NORMAL
ANTIBODY SCREEN: NORMAL
RH FACTOR: NORMAL

## 2024-03-19 PROCEDURE — 6370000000 HC RX 637 (ALT 250 FOR IP): Performed by: INTERNAL MEDICINE

## 2024-03-19 PROCEDURE — 2580000003 HC RX 258: Performed by: PHYSICIAN ASSISTANT

## 2024-03-19 PROCEDURE — 01NB0ZZ RELEASE LUMBAR NERVE, OPEN APPROACH: ICD-10-PCS | Performed by: ORTHOPAEDIC SURGERY

## 2024-03-19 PROCEDURE — 2500000003 HC RX 250 WO HCPCS: Performed by: REGISTERED NURSE

## 2024-03-19 PROCEDURE — 6360000002 HC RX W HCPCS: Performed by: ORTHOPAEDIC SURGERY

## 2024-03-19 PROCEDURE — 2580000003 HC RX 258: Performed by: REGISTERED NURSE

## 2024-03-19 PROCEDURE — 86850 RBC ANTIBODY SCREEN: CPT

## 2024-03-19 PROCEDURE — 72020 X-RAY EXAM OF SPINE 1 VIEW: CPT

## 2024-03-19 PROCEDURE — 86901 BLOOD TYPING SEROLOGIC RH(D): CPT

## 2024-03-19 PROCEDURE — 0SG3071 FUSION OF LUMBOSACRAL JOINT WITH AUTOLOGOUS TISSUE SUBSTITUTE, POSTERIOR APPROACH, POSTERIOR COLUMN, OPEN APPROACH: ICD-10-PCS | Performed by: ORTHOPAEDIC SURGERY

## 2024-03-19 PROCEDURE — 36415 COLL VENOUS BLD VENIPUNCTURE: CPT

## 2024-03-19 PROCEDURE — 0SG30AJ FUSION OF LUMBOSACRAL JOINT WITH INTERBODY FUSION DEVICE, POSTERIOR APPROACH, ANTERIOR COLUMN, OPEN APPROACH: ICD-10-PCS | Performed by: ORTHOPAEDIC SURGERY

## 2024-03-19 PROCEDURE — 6360000002 HC RX W HCPCS: Performed by: ANESTHESIOLOGY

## 2024-03-19 PROCEDURE — 2720000010 HC SURG SUPPLY STERILE: Performed by: ORTHOPAEDIC SURGERY

## 2024-03-19 PROCEDURE — 86900 BLOOD TYPING SEROLOGIC ABO: CPT

## 2024-03-19 PROCEDURE — 6370000000 HC RX 637 (ALT 250 FOR IP): Performed by: PHYSICIAN ASSISTANT

## 2024-03-19 PROCEDURE — 6360000002 HC RX W HCPCS

## 2024-03-19 PROCEDURE — C1713 ANCHOR/SCREW BN/BN,TIS/BN: HCPCS | Performed by: ORTHOPAEDIC SURGERY

## 2024-03-19 PROCEDURE — C1889 IMPLANT/INSERT DEVICE, NOC: HCPCS | Performed by: ORTHOPAEDIC SURGERY

## 2024-03-19 PROCEDURE — 3600000014 HC SURGERY LEVEL 4 ADDTL 15MIN: Performed by: ORTHOPAEDIC SURGERY

## 2024-03-19 PROCEDURE — 3600000004 HC SURGERY LEVEL 4 BASE: Performed by: ORTHOPAEDIC SURGERY

## 2024-03-19 PROCEDURE — 6360000002 HC RX W HCPCS: Performed by: PHYSICIAN ASSISTANT

## 2024-03-19 PROCEDURE — 01NR0ZZ RELEASE SACRAL NERVE, OPEN APPROACH: ICD-10-PCS | Performed by: ORTHOPAEDIC SURGERY

## 2024-03-19 PROCEDURE — 7100000001 HC PACU RECOVERY - ADDTL 15 MIN: Performed by: ORTHOPAEDIC SURGERY

## 2024-03-19 PROCEDURE — 3700000001 HC ADD 15 MINUTES (ANESTHESIA): Performed by: ORTHOPAEDIC SURGERY

## 2024-03-19 PROCEDURE — 3700000000 HC ANESTHESIA ATTENDED CARE: Performed by: ORTHOPAEDIC SURGERY

## 2024-03-19 PROCEDURE — 7100000000 HC PACU RECOVERY - FIRST 15 MIN: Performed by: ORTHOPAEDIC SURGERY

## 2024-03-19 PROCEDURE — 6360000002 HC RX W HCPCS: Performed by: REGISTERED NURSE

## 2024-03-19 PROCEDURE — 2709999900 HC NON-CHARGEABLE SUPPLY: Performed by: ORTHOPAEDIC SURGERY

## 2024-03-19 DEVICE — SET SCREW, 5.5-6.0
Type: IMPLANTABLE DEVICE | Site: SPINE LUMBAR | Status: FUNCTIONAL
Brand: CORTERA

## 2024-03-19 DEVICE — SCREW, POLY, SOLID, 7.5X50
Type: IMPLANTABLE DEVICE | Site: SPINE LUMBAR | Status: FUNCTIONAL
Brand: CORTERA

## 2024-03-19 DEVICE — SCREW, POLY, SOLID, 7.5X45
Type: IMPLANTABLE DEVICE | Site: SPINE LUMBAR | Status: FUNCTIONAL
Brand: CORTERA

## 2024-03-19 DEVICE — ROD, TI, PREBENT, 5.5X35
Type: IMPLANTABLE DEVICE | Site: SPINE LUMBAR | Status: FUNCTIONAL
Brand: CORTERA

## 2024-03-19 DEVICE — IMPLANTABLE DEVICE: Type: IMPLANTABLE DEVICE | Site: SPINE LUMBAR | Status: FUNCTIONAL

## 2024-03-19 RX ORDER — SODIUM CHLORIDE 9 MG/ML
INJECTION, SOLUTION INTRAVENOUS CONTINUOUS
Status: DISCONTINUED | OUTPATIENT
Start: 2024-03-19 | End: 2024-03-19 | Stop reason: HOSPADM

## 2024-03-19 RX ORDER — SODIUM CHLORIDE 0.9 % (FLUSH) 0.9 %
5-40 SYRINGE (ML) INJECTION EVERY 12 HOURS SCHEDULED
Status: DISCONTINUED | OUTPATIENT
Start: 2024-03-19 | End: 2024-03-21 | Stop reason: HOSPADM

## 2024-03-19 RX ORDER — MORPHINE SULFATE 2 MG/ML
2 INJECTION, SOLUTION INTRAMUSCULAR; INTRAVENOUS
Status: DISCONTINUED | OUTPATIENT
Start: 2024-03-19 | End: 2024-03-21 | Stop reason: HOSPADM

## 2024-03-19 RX ORDER — SODIUM CHLORIDE 9 MG/ML
INJECTION, SOLUTION INTRAVENOUS PRN
Status: DISCONTINUED | OUTPATIENT
Start: 2024-03-19 | End: 2024-03-21 | Stop reason: HOSPADM

## 2024-03-19 RX ORDER — SODIUM CHLORIDE 0.9 % (FLUSH) 0.9 %
5-40 SYRINGE (ML) INJECTION PRN
Status: DISCONTINUED | OUTPATIENT
Start: 2024-03-19 | End: 2024-03-19

## 2024-03-19 RX ORDER — SODIUM CHLORIDE 0.9 % (FLUSH) 0.9 %
5-40 SYRINGE (ML) INJECTION PRN
Status: DISCONTINUED | OUTPATIENT
Start: 2024-03-19 | End: 2024-03-19 | Stop reason: HOSPADM

## 2024-03-19 RX ORDER — OMEPRAZOLE 20 MG/1
20 CAPSULE, DELAYED RELEASE ORAL DAILY
Status: DISCONTINUED | OUTPATIENT
Start: 2024-03-19 | End: 2024-03-19 | Stop reason: CLARIF

## 2024-03-19 RX ORDER — DEXAMETHASONE SODIUM PHOSPHATE 10 MG/ML
INJECTION, EMULSION INTRAMUSCULAR; INTRAVENOUS PRN
Status: DISCONTINUED | OUTPATIENT
Start: 2024-03-19 | End: 2024-03-19 | Stop reason: SDUPTHER

## 2024-03-19 RX ORDER — OXYCODONE HYDROCHLORIDE 5 MG/1
5 TABLET ORAL EVERY 6 HOURS PRN
Status: DISCONTINUED | OUTPATIENT
Start: 2024-03-19 | End: 2024-03-21 | Stop reason: HOSPADM

## 2024-03-19 RX ORDER — FENTANYL CITRATE 50 UG/ML
50 INJECTION, SOLUTION INTRAMUSCULAR; INTRAVENOUS EVERY 5 MIN PRN
Status: COMPLETED | OUTPATIENT
Start: 2024-03-19 | End: 2024-03-19

## 2024-03-19 RX ORDER — OMEPRAZOLE 20 MG/1
20 CAPSULE, DELAYED RELEASE ORAL DAILY
COMMUNITY
Start: 2024-03-12

## 2024-03-19 RX ORDER — MORPHINE SULFATE 2 MG/ML
2 INJECTION, SOLUTION INTRAMUSCULAR; INTRAVENOUS
Status: DISCONTINUED | OUTPATIENT
Start: 2024-03-19 | End: 2024-03-19

## 2024-03-19 RX ORDER — PANTOPRAZOLE SODIUM 40 MG/1
40 TABLET, DELAYED RELEASE ORAL
Status: DISCONTINUED | OUTPATIENT
Start: 2024-03-19 | End: 2024-03-21 | Stop reason: HOSPADM

## 2024-03-19 RX ORDER — MORPHINE SULFATE 4 MG/ML
4 INJECTION, SOLUTION INTRAMUSCULAR; INTRAVENOUS
Status: DISCONTINUED | OUTPATIENT
Start: 2024-03-19 | End: 2024-03-19

## 2024-03-19 RX ORDER — HYDROMORPHONE HYDROCHLORIDE 2 MG/ML
INJECTION, SOLUTION INTRAMUSCULAR; INTRAVENOUS; SUBCUTANEOUS PRN
Status: DISCONTINUED | OUTPATIENT
Start: 2024-03-19 | End: 2024-03-19 | Stop reason: SDUPTHER

## 2024-03-19 RX ORDER — LABETALOL HYDROCHLORIDE 5 MG/ML
10 INJECTION, SOLUTION INTRAVENOUS
Status: DISCONTINUED | OUTPATIENT
Start: 2024-03-19 | End: 2024-03-19

## 2024-03-19 RX ORDER — BISACODYL 5 MG/1
5 TABLET, DELAYED RELEASE ORAL DAILY
Status: DISCONTINUED | OUTPATIENT
Start: 2024-03-19 | End: 2024-03-20

## 2024-03-19 RX ORDER — MORPHINE SULFATE 2 MG/ML
2 INJECTION, SOLUTION INTRAMUSCULAR; INTRAVENOUS EVERY 5 MIN PRN
Status: DISCONTINUED | OUTPATIENT
Start: 2024-03-19 | End: 2024-03-19

## 2024-03-19 RX ORDER — ONDANSETRON 2 MG/ML
INJECTION INTRAMUSCULAR; INTRAVENOUS PRN
Status: DISCONTINUED | OUTPATIENT
Start: 2024-03-19 | End: 2024-03-19 | Stop reason: SDUPTHER

## 2024-03-19 RX ORDER — NALOXONE HYDROCHLORIDE 0.4 MG/ML
INJECTION, SOLUTION INTRAMUSCULAR; INTRAVENOUS; SUBCUTANEOUS PRN
Status: DISCONTINUED | OUTPATIENT
Start: 2024-03-19 | End: 2024-03-19

## 2024-03-19 RX ORDER — ONDANSETRON 4 MG/1
4 TABLET, ORALLY DISINTEGRATING ORAL EVERY 8 HOURS PRN
Status: DISCONTINUED | OUTPATIENT
Start: 2024-03-19 | End: 2024-03-21 | Stop reason: HOSPADM

## 2024-03-19 RX ORDER — POLYETHYLENE GLYCOL 3350 17 G/17G
17 POWDER, FOR SOLUTION ORAL DAILY
Status: DISCONTINUED | OUTPATIENT
Start: 2024-03-19 | End: 2024-03-21 | Stop reason: HOSPADM

## 2024-03-19 RX ORDER — SODIUM CHLORIDE 0.9 % (FLUSH) 0.9 %
5-40 SYRINGE (ML) INJECTION EVERY 12 HOURS SCHEDULED
Status: DISCONTINUED | OUTPATIENT
Start: 2024-03-19 | End: 2024-03-19

## 2024-03-19 RX ORDER — SENNA AND DOCUSATE SODIUM 50; 8.6 MG/1; MG/1
1 TABLET, FILM COATED ORAL 2 TIMES DAILY
Status: DISCONTINUED | OUTPATIENT
Start: 2024-03-19 | End: 2024-03-20

## 2024-03-19 RX ORDER — PROPOFOL 10 MG/ML
INJECTION, EMULSION INTRAVENOUS PRN
Status: DISCONTINUED | OUTPATIENT
Start: 2024-03-19 | End: 2024-03-19 | Stop reason: SDUPTHER

## 2024-03-19 RX ORDER — SODIUM CHLORIDE, SODIUM LACTATE, POTASSIUM CHLORIDE, CALCIUM CHLORIDE 600; 310; 30; 20 MG/100ML; MG/100ML; MG/100ML; MG/100ML
INJECTION, SOLUTION INTRAVENOUS CONTINUOUS PRN
Status: DISCONTINUED | OUTPATIENT
Start: 2024-03-19 | End: 2024-03-19 | Stop reason: SDUPTHER

## 2024-03-19 RX ORDER — VANCOMYCIN HYDROCHLORIDE 1 G/20ML
INJECTION, POWDER, LYOPHILIZED, FOR SOLUTION INTRAVENOUS PRN
Status: DISCONTINUED | OUTPATIENT
Start: 2024-03-19 | End: 2024-03-19 | Stop reason: ALTCHOICE

## 2024-03-19 RX ORDER — MIDAZOLAM HYDROCHLORIDE 1 MG/ML
INJECTION INTRAMUSCULAR; INTRAVENOUS PRN
Status: DISCONTINUED | OUTPATIENT
Start: 2024-03-19 | End: 2024-03-19 | Stop reason: SDUPTHER

## 2024-03-19 RX ORDER — SODIUM CHLORIDE 0.9 % (FLUSH) 0.9 %
5-40 SYRINGE (ML) INJECTION PRN
Status: DISCONTINUED | OUTPATIENT
Start: 2024-03-19 | End: 2024-03-21 | Stop reason: HOSPADM

## 2024-03-19 RX ORDER — SODIUM CHLORIDE 0.9 % (FLUSH) 0.9 %
5-40 SYRINGE (ML) INJECTION EVERY 12 HOURS SCHEDULED
Status: DISCONTINUED | OUTPATIENT
Start: 2024-03-19 | End: 2024-03-19 | Stop reason: HOSPADM

## 2024-03-19 RX ORDER — MORPHINE SULFATE 4 MG/ML
4 INJECTION, SOLUTION INTRAMUSCULAR; INTRAVENOUS
Status: DISCONTINUED | OUTPATIENT
Start: 2024-03-19 | End: 2024-03-21 | Stop reason: HOSPADM

## 2024-03-19 RX ORDER — BISACODYL 10 MG
10 SUPPOSITORY, RECTAL RECTAL DAILY PRN
Status: DISCONTINUED | OUTPATIENT
Start: 2024-03-19 | End: 2024-03-21 | Stop reason: HOSPADM

## 2024-03-19 RX ORDER — SODIUM CHLORIDE 9 MG/ML
INJECTION, SOLUTION INTRAVENOUS CONTINUOUS
Status: DISCONTINUED | OUTPATIENT
Start: 2024-03-19 | End: 2024-03-21 | Stop reason: HOSPADM

## 2024-03-19 RX ORDER — ONDANSETRON 2 MG/ML
4 INJECTION INTRAMUSCULAR; INTRAVENOUS EVERY 6 HOURS PRN
Status: DISCONTINUED | OUTPATIENT
Start: 2024-03-19 | End: 2024-03-21 | Stop reason: HOSPADM

## 2024-03-19 RX ORDER — NICOTINE 21 MG/24HR
1 PATCH, TRANSDERMAL 24 HOURS TRANSDERMAL DAILY
Status: DISCONTINUED | OUTPATIENT
Start: 2024-03-19 | End: 2024-03-21 | Stop reason: HOSPADM

## 2024-03-19 RX ORDER — FENTANYL CITRATE 50 UG/ML
INJECTION, SOLUTION INTRAMUSCULAR; INTRAVENOUS PRN
Status: DISCONTINUED | OUTPATIENT
Start: 2024-03-19 | End: 2024-03-19 | Stop reason: SDUPTHER

## 2024-03-19 RX ORDER — CYCLOBENZAPRINE HCL 10 MG
10 TABLET ORAL EVERY 12 HOURS PRN
Status: DISCONTINUED | OUTPATIENT
Start: 2024-03-19 | End: 2024-03-21 | Stop reason: HOSPADM

## 2024-03-19 RX ORDER — SODIUM CHLORIDE 9 MG/ML
INJECTION, SOLUTION INTRAVENOUS PRN
Status: DISCONTINUED | OUTPATIENT
Start: 2024-03-19 | End: 2024-03-19 | Stop reason: HOSPADM

## 2024-03-19 RX ORDER — ROCURONIUM BROMIDE 10 MG/ML
INJECTION, SOLUTION INTRAVENOUS PRN
Status: DISCONTINUED | OUTPATIENT
Start: 2024-03-19 | End: 2024-03-19 | Stop reason: SDUPTHER

## 2024-03-19 RX ORDER — SODIUM CHLORIDE 9 MG/ML
INJECTION, SOLUTION INTRAVENOUS PRN
Status: DISCONTINUED | OUTPATIENT
Start: 2024-03-19 | End: 2024-03-19

## 2024-03-19 RX ORDER — LIDOCAINE HCL/PF 100 MG/5ML
SYRINGE (ML) INJECTION PRN
Status: DISCONTINUED | OUTPATIENT
Start: 2024-03-19 | End: 2024-03-19 | Stop reason: SDUPTHER

## 2024-03-19 RX ADMIN — POLYETHYLENE GLYCOL 3350 17 G: 17 POWDER, FOR SOLUTION ORAL at 11:51

## 2024-03-19 RX ADMIN — DEXAMETHASONE SODIUM PHOSPHATE 8 MG: 10 INJECTION, EMULSION INTRAMUSCULAR; INTRAVENOUS at 08:32

## 2024-03-19 RX ADMIN — FENTANYL CITRATE 50 MCG: 50 INJECTION INTRAMUSCULAR; INTRAVENOUS at 10:35

## 2024-03-19 RX ADMIN — FENTANYL CITRATE 50 MCG: 50 INJECTION INTRAMUSCULAR; INTRAVENOUS at 10:40

## 2024-03-19 RX ADMIN — SUGAMMADEX 100 MG: 100 INJECTION, SOLUTION INTRAVENOUS at 10:06

## 2024-03-19 RX ADMIN — HYDROMORPHONE HYDROCHLORIDE 0.5 MG: 1 INJECTION, SOLUTION INTRAMUSCULAR; INTRAVENOUS; SUBCUTANEOUS at 10:45

## 2024-03-19 RX ADMIN — PROPOFOL 50 MG: 10 INJECTION, EMULSION INTRAVENOUS at 09:43

## 2024-03-19 RX ADMIN — SENNOSIDES AND DOCUSATE SODIUM 1 TABLET: 50; 8.6 TABLET ORAL at 19:46

## 2024-03-19 RX ADMIN — HYDROMORPHONE HYDROCHLORIDE 0.5 MG: 2 INJECTION INTRAMUSCULAR; INTRAVENOUS; SUBCUTANEOUS at 09:43

## 2024-03-19 RX ADMIN — SODIUM CHLORIDE: 9 INJECTION, SOLUTION INTRAVENOUS at 11:56

## 2024-03-19 RX ADMIN — HYDROMORPHONE HYDROCHLORIDE 1 MG: 2 INJECTION INTRAMUSCULAR; INTRAVENOUS; SUBCUTANEOUS at 09:33

## 2024-03-19 RX ADMIN — SODIUM CHLORIDE, POTASSIUM CHLORIDE, SODIUM LACTATE AND CALCIUM CHLORIDE: 600; 310; 30; 20 INJECTION, SOLUTION INTRAVENOUS at 09:02

## 2024-03-19 RX ADMIN — MORPHINE SULFATE 4 MG: 4 INJECTION, SOLUTION INTRAMUSCULAR; INTRAVENOUS at 18:48

## 2024-03-19 RX ADMIN — PROPOFOL 150 MG: 10 INJECTION, EMULSION INTRAVENOUS at 08:24

## 2024-03-19 RX ADMIN — OXYCODONE 5 MG: 5 TABLET ORAL at 15:35

## 2024-03-19 RX ADMIN — MIDAZOLAM 2 MG: 1 INJECTION INTRAMUSCULAR; INTRAVENOUS at 08:22

## 2024-03-19 RX ADMIN — SODIUM CHLORIDE, PRESERVATIVE FREE 10 ML: 5 INJECTION INTRAVENOUS at 19:46

## 2024-03-19 RX ADMIN — ROCURONIUM BROMIDE 20 MG: 10 INJECTION INTRAVENOUS at 08:45

## 2024-03-19 RX ADMIN — FENTANYL CITRATE 100 MCG: 50 INJECTION, SOLUTION INTRAMUSCULAR; INTRAVENOUS at 08:35

## 2024-03-19 RX ADMIN — PROPOFOL 50 MG: 10 INJECTION, EMULSION INTRAVENOUS at 09:22

## 2024-03-19 RX ADMIN — SUGAMMADEX 100 MG: 100 INJECTION, SOLUTION INTRAVENOUS at 09:35

## 2024-03-19 RX ADMIN — Medication 60 MG: at 08:24

## 2024-03-19 RX ADMIN — OXYCODONE 5 MG: 5 TABLET ORAL at 23:00

## 2024-03-19 RX ADMIN — WATER 2000 MG: 1 INJECTION INTRAMUSCULAR; INTRAVENOUS; SUBCUTANEOUS at 19:45

## 2024-03-19 RX ADMIN — PANTOPRAZOLE SODIUM 40 MG: 40 TABLET, DELAYED RELEASE ORAL at 11:50

## 2024-03-19 RX ADMIN — ONDANSETRON 4 MG: 2 INJECTION INTRAMUSCULAR; INTRAVENOUS at 08:32

## 2024-03-19 RX ADMIN — HYDROMORPHONE HYDROCHLORIDE 0.5 MG: 2 INJECTION INTRAMUSCULAR; INTRAVENOUS; SUBCUTANEOUS at 10:03

## 2024-03-19 RX ADMIN — SODIUM CHLORIDE: 9 INJECTION, SOLUTION INTRAVENOUS at 08:01

## 2024-03-19 RX ADMIN — SODIUM CHLORIDE, PRESERVATIVE FREE 10 ML: 5 INJECTION INTRAVENOUS at 11:51

## 2024-03-19 RX ADMIN — HYDROMORPHONE HYDROCHLORIDE 0.5 MG: 1 INJECTION, SOLUTION INTRAMUSCULAR; INTRAVENOUS; SUBCUTANEOUS at 10:50

## 2024-03-19 RX ADMIN — ROCURONIUM BROMIDE 50 MG: 10 INJECTION INTRAVENOUS at 08:24

## 2024-03-19 RX ADMIN — SENNOSIDES AND DOCUSATE SODIUM 1 TABLET: 50; 8.6 TABLET ORAL at 11:50

## 2024-03-19 RX ADMIN — WATER 2000 MG: 1 INJECTION INTRAMUSCULAR; INTRAVENOUS; SUBCUTANEOUS at 11:51

## 2024-03-19 RX ADMIN — WATER 2000 MG: 1 INJECTION INTRAMUSCULAR; INTRAVENOUS; SUBCUTANEOUS at 08:32

## 2024-03-19 RX ADMIN — CYCLOBENZAPRINE 10 MG: 10 TABLET, FILM COATED ORAL at 11:50

## 2024-03-19 RX ADMIN — ROCURONIUM BROMIDE 20 MG: 10 INJECTION INTRAVENOUS at 09:22

## 2024-03-19 RX ADMIN — SODIUM CHLORIDE: 9 INJECTION, SOLUTION INTRAVENOUS at 19:52

## 2024-03-19 ASSESSMENT — PAIN DESCRIPTION - LOCATION
LOCATION: BACK

## 2024-03-19 ASSESSMENT — PAIN DESCRIPTION - ORIENTATION
ORIENTATION: LOWER
ORIENTATION: MID

## 2024-03-19 ASSESSMENT — PAIN - FUNCTIONAL ASSESSMENT
PAIN_FUNCTIONAL_ASSESSMENT: PREVENTS OR INTERFERES SOME ACTIVE ACTIVITIES AND ADLS
PAIN_FUNCTIONAL_ASSESSMENT: PREVENTS OR INTERFERES SOME ACTIVE ACTIVITIES AND ADLS

## 2024-03-19 ASSESSMENT — PAIN DESCRIPTION - DESCRIPTORS
DESCRIPTORS: ACHING;SHOOTING
DESCRIPTORS: ACHING

## 2024-03-19 ASSESSMENT — PAIN SCALES - GENERAL
PAINLEVEL_OUTOF10: 5
PAINLEVEL_OUTOF10: 4
PAINLEVEL_OUTOF10: 6
PAINLEVEL_OUTOF10: 4
PAINLEVEL_OUTOF10: 8
PAINLEVEL_OUTOF10: 7
PAINLEVEL_OUTOF10: 8
PAINLEVEL_OUTOF10: 8
PAINLEVEL_OUTOF10: 6
PAINLEVEL_OUTOF10: 8

## 2024-03-19 ASSESSMENT — PAIN DESCRIPTION - ONSET: ONSET: ON-GOING

## 2024-03-19 ASSESSMENT — PAIN DESCRIPTION - FREQUENCY
FREQUENCY: CONTINUOUS
FREQUENCY: CONTINUOUS

## 2024-03-19 ASSESSMENT — PAIN DESCRIPTION - DIRECTION: RADIATING_TOWARDS: LEFT LEG/KNEE

## 2024-03-19 ASSESSMENT — PAIN DESCRIPTION - PAIN TYPE
TYPE: ACUTE PAIN
TYPE: SURGICAL PAIN

## 2024-03-19 ASSESSMENT — LIFESTYLE VARIABLES: SMOKING_STATUS: 1

## 2024-03-19 NOTE — PROGRESS NOTES
Patient oriented to Same Day department and admitted to Same Day Surgery room 12.   Patient verbalized approval for first name, last initial with physician name on unit whiteboard.     Plan of care reviewed with patient.   Patient room whiteboard filled out and discussed with patient and responsible adult.   Patient and responsible adult offered Same Day Welcome Packet to review.    Call light in reach.   Bed in lowest position, locked, with one bed rail up.   SCDs and warming blanket in place.  Appropriate arm bands on patient.   Bathroom offered.   All questions and concerns of patient addressed.        Meds to Beds:   Patient informed of [facility] Meds to Beds program during admission. Patient is agreeable to program.   Contact information for the pharmacy and the Meds to Beds program:   Name: Dorinda    Relationship to patient:spouse/significant other   Phone number: [phone]

## 2024-03-19 NOTE — PROGRESS NOTES
Deficits: He reports to be experiencing numbness and tingling on both upper and lower extremities but not at the same time. Weakness on the legs which has made his walking \"clumsy\".    He did not have dentures,hearing aids, jewelry ,glasses or personal clothing.

## 2024-03-19 NOTE — ANESTHESIA POSTPROCEDURE EVALUATION
Department of Anesthesiology  Postprocedure Note    Patient: Bereket Ha  MRN: [id number]  YOB: 1975  Date of evaluation: 3/19/2024    Procedure Summary       Date: 03/19/24 Room / Location: [facility] OR  / [facility] OR    Anesthesia Start: 0822 Anesthesia Stop: 1024    Procedure: L5-S1 DECOMPRESSION AND FUSION WITH Transforaminal lumbar interbody fusion (Spine Lumbar) Diagnosis:       Spinal stenosis of lumbar region, unspecified whether neurogenic claudication present      (Spinal stenosis of lumbar region, unspecified whether neurogenic claudication present [M48.061])    Surgeons: Iliana Gipson MD Responsible Provider: Luisito Cm MD    Anesthesia Type: general ASA Status: 2            Anesthesia Type: No value filed.    Shani Phase I: Shani Score: 8    Shani Phase II:      Anesthesia Post Evaluation    Patient location during evaluation: PACU  Patient participation: complete - patient participated  Level of consciousness: awake  Airway patency: patent  Nausea & Vomiting: no vomiting and no nausea  Cardiovascular status: hemodynamically stable  Respiratory status: acceptable and nasal cannula  Hydration status: stable  Pain management: adequate    No notable events documented.

## 2024-03-19 NOTE — CONSULTS
Hospital Medicine Consult    Patient:  Bereket Ha  MRN: [id number]    Referring Physician: Dr Gipson  Reason for Consult: post op medical management  [facility] Physician: Evert Hoffman    HISTORY OF PRESENT ILLNESS:   The patient is a 48 y.o. male with recurrent lower back pain  from L5-S1 lumbar stenosis with neurogenic claudication and L5-S1 DDD with recurrent HNP, admitted for L5-S1 bilateral laminectomies, partial medial facetectomies and foraminotomies of the L5 and S1 nerve roots along with total facetectomies at L5-S1 for complete decompression of the L5 nerve roots, L5-S1 PSF and Left sided TLIF.  Doing well post op. No post op headache, no shortness of breath, no wheezes, no cough.  H/o nicotine dependence 2 PPD cigarette smoker.    Past Medical History:        Diagnosis Date    Kidney stones 03/04/2024       Past Surgical History:        Procedure Laterality Date    LUMBAR SPINE SURGERY N/A 9/9/2022    L5-S1 DECOMPRESSION AND LEFT MICRODISCECTOMY performed by Iliana Gipson MD at [facility] OR       Medications: Scheduled Meds:   sodium chloride flush  5-40 mL IntraVENous 2 times per day    ceFAZolin (ANCEF) IVPB  2,000 mg IntraVENous Q8H    sennosides-docusate sodium  1 tablet Oral BID    bisacodyl  5 mg Oral Daily    polyethylene glycol  17 g Oral Daily    pantoprazole  40 mg Oral QAM AC     Continuous Infusions:   sodium chloride      sodium chloride 125 mL/hr at 03/19/24 1156     PRN Meds:.sodium chloride flush, sodium chloride, ondansetron **OR** ondansetron, cyclobenzaprine, bisacodyl, oxyCODONE, morphine **OR** morphine    Allergies:  Patient has no known allergies.    Social History:   TOBACCO:   reports that he has been smoking cigarettes. He started smoking about 35 years ago. He has a 52.8 pack-year smoking history. He has never used smokeless tobacco.  ETOH:   reports current alcohol use.  OCCUPATION:    REVIEW OF SYSTEMS:    CONSTITUTIONAL:  negative for fevers, chills, fatigue and  weight loss  RESPIRATORY:  negative for cough with sputum and dyspnea  CARDIOVASCULAR:  negative for chest pain, dyspnea, exertional chest pressure/discomfort  GASTROINTESTINAL:  negative for nausea, vomiting, diarrhea and abdominal pain  GENITOURINARY:  negative for frequency and urinary incontinence  MUSCULOSKELETAL:  positive for back and leg Pain  NEUROLOGICAL:  negative for headaches, dizziness and syncope  BEHAVIOR/PSYCH:  negative for depressed mood, increased agitation and anxiety     Family History:       Problem Relation Age of Onset    No Known Problems Father     No Known Problems Sister     No Known Problems Brother        Physical Exam:    Vitals: BP (!) 117/56   Pulse 72   Temp 98.3 °F (36.8 °C) (Oral)   Resp 16   Ht 1.829 m (6')   Wt 97.6 kg (215 lb 4 oz)   SpO2 93%   BMI 29.19 kg/m²   General appearance: alert, appears stated age and cooperative  Skin: Skin color, texture, turgor normal. No rashes or lesions  HEENT: Head: atraumatic  Neck: no adenopathy, no carotid bruit, and no JVD  Lungs: clear to auscultation bilaterally  Heart: regular rate and rhythm and S1, S2 normal  Abdomen: soft, non-tender; bowel sounds normal; no masses,  no organomegaly  Extremities: no edema, redness or tenderness in the calves or thighs  Neurologic: Mental status: Alert, oriented, thought content appropriate      Assessment and Plan   Lumbar spinal stenosis with NC  S/p L5-S1 decompression laminectomy / PSF  Nicotine dependence, 2 PPD    Post op analgesia  Bowel regimen  Nicotine patch  SCD.  Bronchodilators prn, incentive spirometry.  Thank you for the consult Dr Gipson.        Patient Active Problem List   Diagnosis Code    Lumbar stenosis with neurogenic claudication M48.062       Rebeca Shields MD, MD  Consulting Internist.  3/19/2024

## 2024-03-19 NOTE — PROGRESS NOTES
1020: Pt arrives to pacu, awakens to verbal stimuli. Pt on room air, respirations easy and unlabored. VSS  1035: Pt c/o pain 8/10, 50mcg of fentanyl administered  1040: No change in pain, 50mcg of fentanyl given  1045: No change in pain, 0.5mg of dilaudid administered  1050: No change in pain, 0.5mg of dilaudid administered  1100: pt states pain improving and tolerable  1107: report called to elly on 7k  1110: pt meets criteria for discharge from pacu at this time  1121 : pt transported to 7K15 in stable condition

## 2024-03-19 NOTE — ANESTHESIA PRE PROCEDURE
Department of Anesthesiology  Preprocedure Note       Name:  Bereket Ha   Age:  48 y.o.  :  1975                                          MRN:  [id number]         Date:  3/19/2024      Surgeon: Surgeon(s):  Iliana Gipson MD    Procedure: Procedure(s):  L5-S1 DECOMPRESSION AND FUSION WITH TLIF    Medications prior to admission:   Prior to Admission medications    Medication Sig Start Date End Date Taking? Authorizing Provider   acetaminophen (TYLENOL) 325 MG tablet Take 1 tablet by mouth every 6 hours as needed for Pain    ProviderElie MD   gabapentin (NEURONTIN) 100 MG capsule Take 100 mg by mouth 3 times daily.  Patient not taking: Reported on 3/7/2024    Provider, MD Elie       Current medications:    Current Facility-Administered Medications   Medication Dose Route Frequency Provider Last Rate Last Admin   • 0.9 % sodium chloride infusion   IntraVENous Continuous Trent Morgan PA-C       • sodium chloride flush 0.9 % injection 5-40 mL  5-40 mL IntraVENous 2 times per day Trent Morgan PA-C       • sodium chloride flush 0.9 % injection 5-40 mL  5-40 mL IntraVENous PRN Trent Morgan PA-C       • 0.9 % sodium chloride infusion   IntraVENous PRN Trent Morgan PA-C       • ceFAZolin (ANCEF) 2,000 mg in sterile water 20 mL IV syringe  2,000 mg IntraVENous On Call to OR Trent Morgan PA-C           Allergies:  No Known Allergies    Problem List:    Patient Active Problem List   Diagnosis Code   • Lumbar stenosis with neurogenic claudication M48.062       Past Medical History:        Diagnosis Date   • Kidney stones 2024       Past Surgical History:        Procedure Laterality Date   • LUMBAR SPINE SURGERY N/A 2022    L5-S1 DECOMPRESSION AND LEFT MICRODISCECTOMY performed by Iliaan Gipson MD at [facility] OR       Social History:    Social History     Tobacco Use   • Smoking status: Every Day     Current packs/day: 1.50     Average packs/day: 1.5 packs/day

## 2024-03-19 NOTE — BRIEF OP NOTE
Brief Postoperative Note      Patient: Bereket Ha  YOB: 1975  MRN: [id number]    Date of Procedure: 3/19/2024    Pre-Op Diagnosis Codes:     * Spinal stenosis of lumbar region, unspecified whether neurogenic claudication present [M48.061]    Post-Op Diagnosis: Same       Procedure(s):  L5-S1 DECOMPRESSION AND FUSION WITH Transforaminal lumbar interbody fusion    Surgeon(s):  Iliana Gipson MD    Assistant:  Physician Assistant: Trent Morgan PA-C    Anesthesia: General    Estimated Blood Loss (mL): 300     Complications: None    Specimens:   * No specimens in log *    Implants:  Implant Name Type Inv. Item Serial No.  Lot No. LRB No. Used Action   CAGE SPNL 6 DEG [id number] MM TIPLUS TECHNOLOGY [employer] - [id number]  CAGE SPNL 6 DEG [id number] MM TIPLUS TECHNOLOGY [employer]TC  [employer] SPINE [employer] [id number] N/A 1 Implanted   SET SCREW 5.5-6.0 - [id number]  SET SCREW 5.5-6.0  SURGALIGN SPINE [employer]  N/A 4 Implanted   SCREW POLY SOLID 7.5X50 - [id number]  SCREW POLY SOLID 7.5X50  [employer] SPINE [employer]  N/A 2 Implanted   SCREW POLY SOLID 7.5X45 - [id number]  SCREW POLY SOLID 7.5X45  [employer] SPINE [employer]  N/A 2 Implanted   AYESHA TI PREBENT 5.5X35 - [id number]  AYESHA TI PREBENT 5.5X35  SURGALIGN SPINE [employer]CHNOLOGIES INC  N/A 2 Implanted   CAGE SPNL 6 DEG [id number] MM TIPLUS [employer]TC - [id number]  CAGE SPNL 6 DEG [id number] MM TIPLUS TECHNOLOGY [employer]TILINK[employer]muteTC  [employer] SPINE [employer]  N/A 1 Implanted         Drains:   Closed/Suction Drain Left Back Accordion (Active)   Site Description Clean, dry & intact 03/19/24 0955   Dressing Status New dressing applied;Clean, dry & intact 03/19/24 0955   Drain Status Compressed 03/19/24 0955       Closed/Suction Drain Right Back Accordion (Active)   Site Description Clean, dry & intact 03/19/24 0955   Dressing Status New dressing applied;Clean, dry & intact 03/19/24 0955   Drain  Status Compressed 03/19/24 0955       Findings: lumbar stenosis      Electronically signed by Trent Morgan PA-C on 3/19/2024 at 10:25 AM

## 2024-03-19 NOTE — OP NOTE
OPERATIVE REPORT     PATIENT NAME: Bereket Ha                              : 1975  MED REC NO:  [id number]                         ACCOUNT NO: -                               ROOM: 12  ADMISSION DATE: 3/19/2024    PROVIDER:  Iliana Gipson M.D.     DATE OF PROCEDURE: 3/19/2024     PREOPERATIVE DIAGNOSES:  1.  L5-S1 lumbar stenosis with neurogenic claudication.  2.  L5-S1 DDD with recurrent HNP         POSTOPERATIVE DIAGNOSES:  1.  L5-S1 lumbar stenosis with neurogenic claudication.  2.  2.  L5-S1 DDD with recurrent HNP       PROCEDURES:  1.  L5-S1 bilateral laminectomies, partial medial facetectomies and  foraminotomies of the L5 and S1 nerve roots along with total facetectomies  at L5-S1 for complete decompression of the L5 nerve roots.  2.  L5-S1 posterior spinal fusion.  3.  L5-S1 posterior spine instrumentation, Legacy, [employer]tr[employer].  4. Left sided Transforaminal lumbar interbody fusion     SURGEON:  Iliana Feng M.D.     ASSISTANTS:  NERY Morgan     ANESTHESIA:  General.     INDICATIONS:  This is a 48 y.o. male with refractory back and leg pain from lumbar stenosis, advanced disk degeneration, and recurrent disc herniation. He has tried and failed conservative therapy including medication management, physical therapy, and epidural steroid injections.  Due to the persistence of the symptoms and reduction in ADLs, patient elected surgical treatment.  Patient therefore understood the indications for the surgery as well as risks, benefits, and alternatives.  These risks included, but are not limited to paralysis, infection, dural tear, nerve root injury, nonunion, hematoma, nerve root injury, DVT/PE, stroke, MI, death etc.  All questions were answered and informed consent was obtained.     OPERATIVE PROCEDURE:  The patient was taken to the operating room by Anesthesiology Service and had satisfactory general anesthesia.  A first-generation cephalosporin was given within 1 hour of surgical  up in the office in 6 weeks.  At that time, AP and lateral  x-rays of the lumbar spine will be obtained to assess instrumentation  and fusion.             ILAINA GIPSON M.D.     Trent Morgan PA-C, assisted throughout the procedure with positioning, draping, retraction, wound closure, dressing, and splint application.    Iliana Gipson MD

## 2024-03-20 LAB
ANION GAP SERPL CALC-SCNC: 14 MEQ/L (ref 8–16)
BUN SERPL-MCNC: 12 MG/DL (ref 7–22)
CALCIUM SERPL-MCNC: 8.2 MG/DL (ref 8.5–10.5)
CHLORIDE SERPL-SCNC: 106 MEQ/L (ref 98–111)
CO2 SERPL-SCNC: 17 MEQ/L (ref 23–33)
CREAT SERPL-MCNC: 0.6 MG/DL (ref 0.4–1.2)
DEPRECATED RDW RBC AUTO: 43.2 FL (ref 35–45)
ERYTHROCYTE [DISTWIDTH] IN BLOOD BY AUTOMATED COUNT: 12.2 % (ref 11.5–14.5)
GFR SERPL CREATININE-BSD FRML MDRD: > 60 ML/MIN/1.73M2
GLUCOSE SERPL-MCNC: 211 MG/DL (ref 70–108)
HCT VFR BLD AUTO: 39.4 % (ref 42–52)
HGB BLD-MCNC: 13.4 GM/DL (ref 14–18)
MCH RBC QN AUTO: 32.5 PG (ref 26–33)
MCHC RBC AUTO-ENTMCNC: 34 GM/DL (ref 32.2–35.5)
MCV RBC AUTO: 95.6 FL (ref 80–94)
PLATELET # BLD AUTO: 267 THOU/MM3 (ref 130–400)
PMV BLD AUTO: 9.5 FL (ref 9.4–12.4)
POTASSIUM SERPL-SCNC: 4.2 MEQ/L (ref 3.5–5.2)
RBC # BLD AUTO: 4.12 MILL/MM3 (ref 4.7–6.1)
SODIUM SERPL-SCNC: 137 MEQ/L (ref 135–145)
WBC # BLD AUTO: 17.2 THOU/MM3 (ref 4.8–10.8)

## 2024-03-20 PROCEDURE — G0378 HOSPITAL OBSERVATION PER HR: HCPCS

## 2024-03-20 PROCEDURE — 85027 COMPLETE CBC AUTOMATED: CPT

## 2024-03-20 PROCEDURE — 97162 PT EVAL MOD COMPLEX 30 MIN: CPT

## 2024-03-20 PROCEDURE — 97166 OT EVAL MOD COMPLEX 45 MIN: CPT

## 2024-03-20 PROCEDURE — 6360000002 HC RX W HCPCS: Performed by: PHYSICIAN ASSISTANT

## 2024-03-20 PROCEDURE — 97535 SELF CARE MNGMENT TRAINING: CPT

## 2024-03-20 PROCEDURE — 6370000000 HC RX 637 (ALT 250 FOR IP): Performed by: INTERNAL MEDICINE

## 2024-03-20 PROCEDURE — L0450 TLSO FLEX TRUNK/THOR PRE OTS: HCPCS

## 2024-03-20 PROCEDURE — 97530 THERAPEUTIC ACTIVITIES: CPT

## 2024-03-20 PROCEDURE — 36415 COLL VENOUS BLD VENIPUNCTURE: CPT

## 2024-03-20 PROCEDURE — 2580000003 HC RX 258: Performed by: PHYSICIAN ASSISTANT

## 2024-03-20 PROCEDURE — 96374 THER/PROPH/DIAG INJ IV PUSH: CPT

## 2024-03-20 PROCEDURE — 80048 BASIC METABOLIC PNL TOTAL CA: CPT

## 2024-03-20 PROCEDURE — 6370000000 HC RX 637 (ALT 250 FOR IP): Performed by: PHYSICIAN ASSISTANT

## 2024-03-20 PROCEDURE — 1200000000 HC SEMI PRIVATE

## 2024-03-20 RX ORDER — BISACODYL 5 MG/1
10 TABLET, DELAYED RELEASE ORAL DAILY
Status: DISCONTINUED | OUTPATIENT
Start: 2024-03-21 | End: 2024-03-21 | Stop reason: HOSPADM

## 2024-03-20 RX ORDER — SENNA AND DOCUSATE SODIUM 50; 8.6 MG/1; MG/1
2 TABLET, FILM COATED ORAL 2 TIMES DAILY
Status: DISCONTINUED | OUTPATIENT
Start: 2024-03-20 | End: 2024-03-21 | Stop reason: HOSPADM

## 2024-03-20 RX ADMIN — CYCLOBENZAPRINE 10 MG: 10 TABLET, FILM COATED ORAL at 04:59

## 2024-03-20 RX ADMIN — POLYETHYLENE GLYCOL 3350 17 G: 17 POWDER, FOR SOLUTION ORAL at 09:14

## 2024-03-20 RX ADMIN — MORPHINE SULFATE 4 MG: 4 INJECTION, SOLUTION INTRAMUSCULAR; INTRAVENOUS at 00:10

## 2024-03-20 RX ADMIN — PANTOPRAZOLE SODIUM 40 MG: 40 TABLET, DELAYED RELEASE ORAL at 05:00

## 2024-03-20 RX ADMIN — SODIUM CHLORIDE, PRESERVATIVE FREE 10 ML: 5 INJECTION INTRAVENOUS at 20:49

## 2024-03-20 RX ADMIN — BISACODYL 5 MG: 5 TABLET, COATED ORAL at 09:16

## 2024-03-20 RX ADMIN — OXYCODONE 5 MG: 5 TABLET ORAL at 11:36

## 2024-03-20 RX ADMIN — SENNOSIDES AND DOCUSATE SODIUM 2 TABLET: 50; 8.6 TABLET ORAL at 20:23

## 2024-03-20 RX ADMIN — SENNOSIDES AND DOCUSATE SODIUM 1 TABLET: 50; 8.6 TABLET ORAL at 09:19

## 2024-03-20 RX ADMIN — NALOXEGOL OXALATE 12.5 MG: 12.5 TABLET, FILM COATED ORAL at 17:23

## 2024-03-20 RX ADMIN — CYCLOBENZAPRINE 10 MG: 10 TABLET, FILM COATED ORAL at 17:25

## 2024-03-20 RX ADMIN — OXYCODONE 5 MG: 5 TABLET ORAL at 17:41

## 2024-03-20 RX ADMIN — OXYCODONE 5 MG: 5 TABLET ORAL at 23:20

## 2024-03-20 RX ADMIN — OXYCODONE 5 MG: 5 TABLET ORAL at 04:59

## 2024-03-20 RX ADMIN — SODIUM CHLORIDE, PRESERVATIVE FREE 10 ML: 5 INJECTION INTRAVENOUS at 09:53

## 2024-03-20 ASSESSMENT — PAIN SCALES - GENERAL
PAINLEVEL_OUTOF10: 0
PAINLEVEL_OUTOF10: 3
PAINLEVEL_OUTOF10: 5
PAINLEVEL_OUTOF10: 6
PAINLEVEL_OUTOF10: 7
PAINLEVEL_OUTOF10: 7
PAINLEVEL_OUTOF10: 5
PAINLEVEL_OUTOF10: 1
PAINLEVEL_OUTOF10: 3
PAINLEVEL_OUTOF10: 5
PAINLEVEL_OUTOF10: 4

## 2024-03-20 ASSESSMENT — PAIN DESCRIPTION - LOCATION
LOCATION: BACK

## 2024-03-20 ASSESSMENT — PAIN DESCRIPTION - DESCRIPTORS
DESCRIPTORS: DISCOMFORT
DESCRIPTORS: ACHING
DESCRIPTORS: DISCOMFORT
DESCRIPTORS: DISCOMFORT
DESCRIPTORS: ACHING
DESCRIPTORS: ACHING
DESCRIPTORS: DISCOMFORT

## 2024-03-20 ASSESSMENT — PAIN - FUNCTIONAL ASSESSMENT

## 2024-03-20 ASSESSMENT — PAIN DESCRIPTION - ORIENTATION
ORIENTATION: MID
ORIENTATION: MID
ORIENTATION: MID;LOWER
ORIENTATION: MID;LOWER
ORIENTATION: MID
ORIENTATION: LOWER
ORIENTATION: LOWER
ORIENTATION: MID

## 2024-03-20 ASSESSMENT — PAIN DESCRIPTION - PAIN TYPE
TYPE: SURGICAL PAIN

## 2024-03-20 NOTE — PROGRESS NOTES
Bereket Ha was evaluated today and a DME order was entered for a wheeled walker because he requires this to successfully complete daily living tasks of toileting, personal cares, and ambulating.  A wheeled walker is necessary due to the patient's unsteady gait, upper body weakness, and inability to  an ambulation device; and he can ambulate only by pushing a walker instead of a lesser assistive device such as a cane, crutch, or standard walker.  The need for this equipment was discussed with the patient and he understands and is in agreement.

## 2024-03-20 NOTE — PROGRESS NOTES
Department of Orthopedic Surgery  Spine Service  Attending Progress Note        Subjective:  POD#1, patient sitting up in bed, doing ok, leg pain improved, no new issues, No BM    Vitals  VITALS:  /73   Pulse 68   Temp 97.7 °F (36.5 °C) (Oral)   Resp 17   Ht 1.829 m (6')   Wt 97.6 kg (215 lb 4 oz)   SpO2 94%   BMI 29.19 kg/m²   24HR INTAKE/OUTPUT:    Intake/Output Summary (Last 24 hours) at 3/20/2024 0635  Last data filed at 3/20/2024 0536  Gross per 24 hour   Intake 2960 ml   Output 1110 ml   Net 1850 ml     URINARY CATHETER OUTPUT ([facility]):     DRAIN/TUBE OUTPUT:  Closed/Suction Drain Left Back Accordion-Output (ml): 60 ml  Closed/Suction Drain Right Back Accordion-Output (ml): 40 ml      PHYSICAL EXAM:    Orientation:  alert and oriented to person, place and time    Incision:  dressing in place, clean, dry, intact    Lower Extremity Motor :  quadriceps, extensor hallucis longus, dorsiflexion, plantarflexion 5/5 bilaterally  Lower Extremity Sensory:  Intact L1-S1    Flatus:  positive    ABNORMAL EXAM FINDINGS:  none    LABS:    HgB:    Lab Results   Component Value Date/Time    HGB 16.3 03/07/2024 10:23 AM         ASSESSMENT AND PLAN:    Post operative day 1 status post L5-S1 decompression and fusion    1:  Monitor labs and drain output  2:  Activity Level:  as tolerated  3:  Pain Control:  good  4:  Discharge Planning:  pending    Trent Morgan PA-C

## 2024-03-20 NOTE — PLAN OF CARE
Problem: Discharge Planning  Goal: Discharge to home or other facility with appropriate resources  Outcome: Progressing  Flowsheets (Taken 3/20/2024 0056)  Discharge to home or other facility with appropriate resources:   Identify barriers to discharge with patient and caregiver   Arrange for needed discharge resources and transportation as appropriate   Identify discharge learning needs (meds, wound care, etc)     Problem: Pain  Goal: Verbalizes/displays adequate comfort level or baseline comfort level  Outcome: Progressing  Flowsheets (Taken 3/20/2024 0056)  Verbalizes/displays adequate comfort level or baseline comfort level:   Encourage patient to monitor pain and request assistance   Assess pain using appropriate pain scale   Administer analgesics based on type and severity of pain and evaluate response   Implement non-pharmacological measures as appropriate and evaluate response     Problem: ABCDS Injury Assessment  Goal: Absence of physical injury  Outcome: Progressing  Flowsheets (Taken 3/20/2024 0056)  Absence of Physical Injury: Implement safety measures based on patient assessment     Problem: Safety - Adult  Goal: Free from fall injury  Outcome: Progressing  Flowsheets (Taken 3/20/2024 0056)  Free From Fall Injury: Instruct family/caregiver on patient safety     Problem: Skin/Tissue Integrity  Goal: Absence of new skin breakdown  Description: 1.  Monitor for areas of redness and/or skin breakdown  2.  Assess vascular access sites hourly  3.  Every 4-6 hours minimum:  Change oxygen saturation probe site  4.  Every 4-6 hours:  If on nasal continuous positive airway pressure, respiratory therapy assess nares and determine need for appliance change or resting period.  Outcome: Progressing     Problem: Musculoskeletal - Adult  Goal: Return mobility to safest level of function  Outcome: Progressing  Flowsheets (Taken 3/20/2024 0056)  Return Mobility to Safest Level of Function:   Assess patient stability and

## 2024-03-20 NOTE — PROGRESS NOTES
pain    Vitals: Vitals not assessed per clinical judgement, see nursing flowsheet    Social/Functional History:    Lives With: Significant other  Type of Home: House  Home Layout: One level  Home Access: Level entry     Bathroom Shower/Tub: Walk-in shower  Bathroom Toilet: Standard  Bathroom Accessibility: Accessible       ADL Assistance: Independent  Homemaking Assistance: Independent  Homemaking Responsibilities: Yes  Ambulation Assistance: Independent  Transfer Assistance: Independent    Active : Yes     Additional Comments: Pt independent with no use of AD prior.    OBJECTIVE:  Range of Motion:  Right Lower Extremity: WFL  Left Lower Extremity: WFL    Strength:  Right Lower Extremity: WFL  Left Lower Extremity: WFL; BLE not formally assessed d/t LBP, but at least 3+/5 based on movement seen throughout.    Balance:  Static Sitting Balance:  Independent  Static Standing Balance: Stand By Assistance with use of RW; CGA when not using RW    Bed Mobility:  Supine to Sit: Stand By Assistance  Sit to Supine: Stand By Assistance     Transfers:  Sit to Stand: Contact Guard Assistance  Stand to Sit:Contact Guard Assistance    Ambulation:  Stand By Assistance with use of RW, Contact Guard Assistance without RW  Distance: 150ft, 60ft  Surface: Level Tile  Device:Rolling Walker  Gait Deviations:  Slow Karey and Decreased Step Length Bilaterally  **Pt initially able to ambulate 150ft with RW and supervision. After, attempting to ambulate without RW as pt does not ambulate with RW at baseline, however pt requiring CGA and seeking furniture/ railing in hallway for support. Pt reports feeling decreased back pain with use of RW compared to without.     Exercise:  Patient was guided in 2 set(s) 10 reps of exercise to both lower extremities.  Ankle pumps and Long arc quads.  Exercises were completed for increased independence with functional mobility.    Functional Outcome Measures:   AM-PAC Inpatient Mobility Raw Score :  18  AM-PAC Inpatient T-Scale Score : 43.63    ASSESSMENT:  Activity Tolerance:  Patient tolerance of  treatment: good.       Treatment Initiated: Treatment and education initiated within context of evaluation.  Evaluation time included review of current medical information, gathering information related to past medical, social and functional history, completion of standardized testing, formal and informal observation of tasks, assessment of data and development of plan of care and goals.  Treatment time included skilled education and facilitation of tasks to increase safety and independence with functional mobility for improved independence and quality of life.    Assessment:  Body Structures, Functions, Activity Limitations Requiring Skilled Therapeutic Intervention: Decreased functional mobility , Decreased endurance, Decreased balance, Increased pain  Assessment: Bereket Ha is a 48 y.o. male that presents with lumbar stenosis with neurogenic claudication. Pt is s/p lumbar surgery. Pt demonstrates a decrease in baseline by way of bed mobility, transfers and ambulation secondary to decreased activity tolerance, strength, fatigue, and balance deficits. Pt will benefit from skilled PT services throughout admission and beyond hospital discharge for improvements in functional mobility and in order to decrease fall risk and return pt to PLOF.     Therapy Prognosis: Excellent    Requires PT Follow-Up: Yes    Discharge Recommendations:  Discharge Recommendations: Outpatient PT, Home with assist PRN    Patient Education:      .    Patient Education  Education Given To: Patient  Education Provided: Role of Therapy, Plan of Care  Education Method: Verbal  Barriers to Learning: None  Education Outcome: Verbalized understanding       Equipment Recommendations:  Equipment Needed: Yes  Mobility Devices: Walker  Walker: Rolling    Plan:  Current Treatment Recommendations: Strengthening, Balance training, Functional mobility  training, Transfer training, Gait training, Endurance training, Pain management, Home exercise program, Safety education & training, Patient/Caregiver education & training, Therapeutic activities, Equipment evaluation, education, & procurement  General Plan:  (6x/wk O)    Goals:     Short Term Goals  Time Frame for Short Term Goals: by discharge  Short Term Goal 1: Pt will perform sit<>supine independently to allow for increased independence.  Short Term Goal 2: Pt will perform sit<>stand independently with LRAD PRN to allow for progression of mobility.  Short Term Goal 3: Pt will ambulate 150ft with LRAD PRN and supervision to allow for home access.  Long Term Goals  Time Frame for Long Term Goals : N/A d/t short ELOS    Following session, patient left in safe position with all fall risk precautions in place.
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
## 2024-03-20 NOTE — PROGRESS NOTES
INTERNAL MEDICINE Progress Note  3/20/2024 2:16 PM  Subjective:   Admit Date: 3/19/2024  PCP: Evert Hoffman  Interval History:   Doing better today.  No HA, no legs weakness  No BM    Objective:   Vitals: /76   Pulse 73   Temp 98.2 °F (36.8 °C) (Oral)   Resp 18   Ht 1.829 m (6')   Wt 97.6 kg (215 lb 4 oz)   SpO2 99%   BMI 29.19 kg/m²   General appearance: alert and cooperative with exam  HEENT: Head: atraumatic  Neck: no adenopathy, no carotid bruit, no JVD, and supple, symmetrical, trachea midline  Lungs: clear to auscultation bilaterally  Heart: regular rate and rhythm, S1, S2 normal, no murmur, click, rub or gallop  Abdomen: soft, non-tender; bowel sounds normal; no masses,  no organomegaly  Extremities: extremities normal, atraumatic, no cyanosis or edema  Neurologic: Mental status: Alert, oriented, thought content appropriate      Medications:   Scheduled Meds:   sodium chloride flush  5-40 mL IntraVENous 2 times per day    sennosides-docusate sodium  1 tablet Oral BID    bisacodyl  5 mg Oral Daily    polyethylene glycol  17 g Oral Daily    pantoprazole  40 mg Oral QAM AC    nicotine  1 patch TransDERmal Daily     Continuous Infusions:   sodium chloride      sodium chloride Stopped (03/20/24 0335)       Lab Results:   CBC:   Recent Labs     03/20/24  0617   WBC 17.2*   HGB 13.4*        BMP:    Recent Labs     03/20/24  0617      K 4.2      CO2 17*   BUN 12   CREATININE 0.6   GLUCOSE 211*       Assessment and Plan:   Lumbar spinal stenosis with NC  S/p L5-S1 decompression laminectomy / PSF  Nicotine dependence, 2 PPD    plan   Cont Post op analgesia  Laxatives  Nicotine patch  PT/OT    Rebeca Shields MD, MD

## 2024-03-20 NOTE — CARE COORDINATION
3/20/24, 8:24 AM EDT      DISCHARGE PLANNING EVALUATION    Bereket Ha  Admitted: 3/19/2024  Hospital Day: 0    Location: -15/015-A Reason for admit: Spinal stenosis of lumbar region, unspecified whether neurogenic claudication present [M48.061]  Lumbar stenosis with neurogenic claudication [M48.062]    Past Medical History:   Diagnosis Date    Kidney stones 03/04/2024     Planned surgery with Dr Feng  Procedure: 3/19   1.  L5-S1 bilateral laminectomies, partial medial facetectomies and  foraminotomies of the L5 and S1 nerve roots along with total facetectomies  at L5-S1 for complete decompression of the L5 nerve roots.  2.  L5-S1 posterior spinal fusion.  3.  L5-S1 posterior spine instrumentation, Legacy, [employer].  4. Left sided Transforaminal lumbar interbody fusion  Barriers to Discharge: POD 1, monitor lumbar drain outputs x 2, follow therapy recs. Needs BM.    PCP: Evert Hoffman    Readmission Risk Low 0-14, Mod 15-19), High > 20: No data recorded    Advance Directives:      Code Status: Full Code   Patient's Primary Decision Maker is: Legal Next of Kin Dorinda Fitzpatrick spouse      Patient Goals/Plan/Treatment Preferences: Met with Bereket and wife Dorinda; he lives home in [facility]. He has insurance, requests RW (ordered), and was given HH list at PAT visit. Pt requests HH whoever will accept his insurance and can go to [facility]. Will assist with preferences.    1245 pm- pt chose  HH for HH needs/drain mgmt. They are able to accept his insurance. Anju notified at [facility].        Transportation/Food Security/Housekeeping Addressed: No issues identified.     If patient is discharged prior to next notation, then this note serves as note for discharge by case management.

## 2024-03-20 NOTE — PLAN OF CARE
Problem: Discharge Planning  Goal: Discharge to home or other facility with appropriate resources  3/20/2024 1005 by Chandrika Crockett RN  Outcome: Progressing  Flowsheets (Taken 3/20/2024 1005)  Discharge to home or other facility with appropriate resources: Identify barriers to discharge with patient and caregiver     Problem: Pain  Goal: Verbalizes/displays adequate comfort level or baseline comfort level  3/20/2024 1005 by Chandrika Crockett RN  Outcome: Progressing  Flowsheets (Taken 3/20/2024 1005)  Verbalizes/displays adequate comfort level or baseline comfort level: Encourage patient to monitor pain and request assistance     Problem: ABCDS Injury Assessment  Goal: Absence of physical injury  3/20/2024 1005 by Chandrika Crockett RN  Outcome: Progressing  Flowsheets (Taken 3/20/2024 1005)  Absence of Physical Injury: Implement safety measures based on patient assessment     Problem: Safety - Adult  Goal: Free from fall injury  3/20/2024 1005 by Chandrika Crockett RN  Outcome: Progressing  Flowsheets (Taken 3/20/2024 1005)  Free From Fall Injury: Instruct family/caregiver on patient safety     Problem: Skin/Tissue Integrity  Goal: Absence of new skin breakdown  Description: 1.  Monitor for areas of redness and/or skin breakdown  2.  Assess vascular access sites hourly  3.  Every 4-6 hours minimum:  Change oxygen saturation probe site  4.  Every 4-6 hours:  If on nasal continuous positive airway pressure, respiratory therapy assess nares and determine need for appliance change or resting period.  3/20/2024 1005 by Chandrika Crockett RN  Outcome: Progressing  Note: No evidence of new skin breakdown assessed this shift.      Problem: Musculoskeletal - Adult  Goal: Return mobility to safest level of function  3/20/2024 1005 by Chandrika Crockett RN  Outcome: Progressing  Flowsheets (Taken 3/20/2024 1005)  Return Mobility to Safest Level of Function: Assess patient stability and activity tolerance for standing, transferring and  ambulating with or without assistive devices     Problem: Musculoskeletal - Adult  Goal: Return ADL status to a safe level of function  3/20/2024 1005 by Chandrika Crockett RN  Outcome: Progressing  Flowsheets (Taken 3/20/2024 1005)  Return ADL Status to a Safe Level of Function: Administer medication as ordered     Problem: Gastrointestinal - Adult  Goal: Maintains or returns to baseline bowel function  3/20/2024 1005 by Chandrika Crockett, RN  Outcome: Progressing  Flowsheets (Taken 3/20/2024 1005)  Maintains or returns to baseline bowel function: Assess bowel function   Care plan reviewed with patient.  Patient verbalize understanding of the plan of care and contribute to goal setting.

## 2024-03-20 NOTE — PROGRESS NOTES
Head to Toe Assessment    [facility] Student Nurse  Head to Toe Assessment Performed at 1429  Skin  General skin appearance / Description: Warm, Dry, Pink  Incisions / Wounds: Surgical incision lower back. IV site left hand.    Neuro/Head  LOC: A+O x 4  Speech: Clear  Eyes PERRLA 3 mm on to a 2 mm, Round and reactive to light  Symmetry of face / tongue: Symmetrical      Chest  Heart sounds / Apical Pulse: Regular, +2, 77  Lung sounds: Clear, symmetrical, Moderate, Regular  Cough:  None    Abdomen/ Pelvis  Bowel sounds: Active in all four quadrants  Passing flatus: Active  Palpation / Inspection: No masses, No tenderness  Last BM: 03/20/2024 before surgery  Continence:  Continent  Urine color / turbidity: yellow    Extremities  Edema: None  Altered Sensation: Sensation Present  Upper extremity push / pulls: Strong, Equal  Left Arm Radial Pulse: Present   Left Upper extremity Capillary Refill: Less than 3  Right Arm Radial Pulse: Present   Right Upper extremity Capillary Refill: Less than 3  Lower extremity pedal push / pulls: Strong, Equal  Left pedal pulse: Present strong  Left posterior tibialis pulse: Present strong   Left lower extremity capillary refill: Less than 3  Right pedal pulse: Present Strong   Right posterior tibialis pulse: Present Strong   Right lower extremity capillary refill: Less than 3  Drift of extremities: No drift present  Pain: No pain stated        1429- Head to Toe Assessment and Vital Signs Completed.   1550- Patient went for a walk  1636- Patient states pain is a 5 and would like pain meds when possible.  1736- Oxycodone given pain level 5  1815- Pain reassessed and patient state \" it's down to a 3 when lying down\"  1900- Vitals taken by student nurse. Patient states \"no pain when lying in bed\"         Signature Wade Hernandez  [facility] / SN

## 2024-03-21 VITALS
OXYGEN SATURATION: 98 % | WEIGHT: 215.25 LBS | HEIGHT: 72 IN | SYSTOLIC BLOOD PRESSURE: 116 MMHG | RESPIRATION RATE: 16 BRPM | TEMPERATURE: 98.6 F | BODY MASS INDEX: 29.16 KG/M2 | DIASTOLIC BLOOD PRESSURE: 78 MMHG | HEART RATE: 85 BPM

## 2024-03-21 LAB
ANION GAP SERPL CALC-SCNC: 12 MEQ/L (ref 8–16)
BUN SERPL-MCNC: 12 MG/DL (ref 7–22)
CALCIUM SERPL-MCNC: 8.3 MG/DL (ref 8.5–10.5)
CHLORIDE SERPL-SCNC: 104 MEQ/L (ref 98–111)
CO2 SERPL-SCNC: 24 MEQ/L (ref 23–33)
CREAT SERPL-MCNC: 0.7 MG/DL (ref 0.4–1.2)
DEPRECATED RDW RBC AUTO: 45 FL (ref 35–45)
ERYTHROCYTE [DISTWIDTH] IN BLOOD BY AUTOMATED COUNT: 12.7 % (ref 11.5–14.5)
GFR SERPL CREATININE-BSD FRML MDRD: > 60 ML/MIN/1.73M2
GLUCOSE SERPL-MCNC: 109 MG/DL (ref 70–108)
HCT VFR BLD AUTO: 37.9 % (ref 42–52)
HGB BLD-MCNC: 12.7 GM/DL (ref 14–18)
MCH RBC QN AUTO: 32.5 PG (ref 26–33)
MCHC RBC AUTO-ENTMCNC: 33.5 GM/DL (ref 32.2–35.5)
MCV RBC AUTO: 96.9 FL (ref 80–94)
PLATELET # BLD AUTO: 259 THOU/MM3 (ref 130–400)
PMV BLD AUTO: 9.5 FL (ref 9.4–12.4)
POTASSIUM SERPL-SCNC: 4 MEQ/L (ref 3.5–5.2)
RBC # BLD AUTO: 3.91 MILL/MM3 (ref 4.7–6.1)
SODIUM SERPL-SCNC: 140 MEQ/L (ref 135–145)
WBC # BLD AUTO: 16.5 THOU/MM3 (ref 4.8–10.8)

## 2024-03-21 PROCEDURE — 6370000000 HC RX 637 (ALT 250 FOR IP): Performed by: PHYSICIAN ASSISTANT

## 2024-03-21 PROCEDURE — 96376 TX/PRO/DX INJ SAME DRUG ADON: CPT

## 2024-03-21 PROCEDURE — G0378 HOSPITAL OBSERVATION PER HR: HCPCS

## 2024-03-21 PROCEDURE — 80048 BASIC METABOLIC PNL TOTAL CA: CPT

## 2024-03-21 PROCEDURE — 6360000002 HC RX W HCPCS: Performed by: PHYSICIAN ASSISTANT

## 2024-03-21 PROCEDURE — 36415 COLL VENOUS BLD VENIPUNCTURE: CPT

## 2024-03-21 PROCEDURE — 85027 COMPLETE CBC AUTOMATED: CPT

## 2024-03-21 PROCEDURE — 6370000000 HC RX 637 (ALT 250 FOR IP): Performed by: INTERNAL MEDICINE

## 2024-03-21 RX ADMIN — OXYCODONE 5 MG: 5 TABLET ORAL at 05:24

## 2024-03-21 RX ADMIN — POLYETHYLENE GLYCOL 3350 17 G: 17 POWDER, FOR SOLUTION ORAL at 08:13

## 2024-03-21 RX ADMIN — BISACODYL 10 MG: 5 TABLET, COATED ORAL at 08:13

## 2024-03-21 RX ADMIN — CYCLOBENZAPRINE 10 MG: 10 TABLET, FILM COATED ORAL at 05:25

## 2024-03-21 RX ADMIN — NALOXEGOL OXALATE 12.5 MG: 12.5 TABLET, FILM COATED ORAL at 05:24

## 2024-03-21 RX ADMIN — MORPHINE SULFATE 4 MG: 4 INJECTION, SOLUTION INTRAMUSCULAR; INTRAVENOUS at 01:23

## 2024-03-21 RX ADMIN — SENNOSIDES AND DOCUSATE SODIUM 2 TABLET: 50; 8.6 TABLET ORAL at 08:13

## 2024-03-21 RX ADMIN — PANTOPRAZOLE SODIUM 40 MG: 40 TABLET, DELAYED RELEASE ORAL at 05:25

## 2024-03-21 ASSESSMENT — PAIN DESCRIPTION - DESCRIPTORS
DESCRIPTORS: ACHING
DESCRIPTORS: ACHING

## 2024-03-21 ASSESSMENT — PAIN SCALES - GENERAL
PAINLEVEL_OUTOF10: 6
PAINLEVEL_OUTOF10: 3
PAINLEVEL_OUTOF10: 8
PAINLEVEL_OUTOF10: 4
PAINLEVEL_OUTOF10: 3

## 2024-03-21 ASSESSMENT — PAIN - FUNCTIONAL ASSESSMENT
PAIN_FUNCTIONAL_ASSESSMENT: ACTIVITIES ARE NOT PREVENTED
PAIN_FUNCTIONAL_ASSESSMENT: PREVENTS OR INTERFERES SOME ACTIVE ACTIVITIES AND ADLS

## 2024-03-21 ASSESSMENT — PAIN DESCRIPTION - LOCATION
LOCATION: BACK
LOCATION: BACK

## 2024-03-21 ASSESSMENT — PAIN DESCRIPTION - ORIENTATION
ORIENTATION: MID;LOWER
ORIENTATION: MID;LOWER

## 2024-03-21 NOTE — PROGRESS NOTES
Department of Orthopedic Surgery  Spine Service  Attending Progress Note        Subjective:  POD#2, patient doing well, leg pain improved, no new issues, No BM    Vitals  VITALS:  /70   Pulse 67   Temp 97.7 °F (36.5 °C) (Oral)   Resp 16   Ht 1.829 m (6')   Wt 97.6 kg (215 lb 4 oz)   SpO2 98%   BMI 29.19 kg/m²   24HR INTAKE/OUTPUT:    Intake/Output Summary (Last 24 hours) at 3/21/2024 0644  Last data filed at 3/21/2024 0542  Gross per 24 hour   Intake 1750 ml   Output 475 ml   Net 1275 ml       URINARY CATHETER OUTPUT ([facility]):     DRAIN/TUBE OUTPUT:  Closed/Suction Drain Left Back Accordion-Output (ml): 130 ml  Closed/Suction Drain Right Back Accordion-Output (ml): 5 ml      PHYSICAL EXAM:    Orientation:  alert and oriented to person, place and time    Incision:  dressing in place, clean, dry, intact    Lower Extremity Motor :  quadriceps, extensor hallucis longus, dorsiflexion, plantarflexion 5/5 bilaterally  Lower Extremity Sensory:  Intact L1-S1    Flatus:  positive    ABNORMAL EXAM FINDINGS:  none    LABS:    HgB:    Lab Results   Component Value Date/Time    HGB 12.7 03/21/2024 05:27 AM         ASSESSMENT AND PLAN:    Post operative day 2 status post L5-S1 decompression and fusion    1:  Monitor labs and drain output  2:  Activity Level:  as tolerated  3:  Pain Control:  good  4:  Discharge Planning:  today after BM, home with home health    Trent Morgan PA-C

## 2024-03-21 NOTE — DISCHARGE INSTRUCTIONS
Back Surgery    Activity    No lifting, pushing, or pulling    Up as tolerated at least 3-4 times per day.    Up walking-helps to decrease the risk of blood clots    Wear bhavana hose as directed per your physician     No driving until cleared by your physician    Back Brace or abdominal binder    If your physician prescribes a brace/abdominal binder, wear back brace at all times.     May remove when in bed or bathing    Follow all back precautions.    Incision Care    Keep back incision dry and intact. Apply clean dry dressing once a day.     May shower  if  no drainage from your incision-unless otherwise directed per your physician    No tub baths-no soaking of incision-no swimming     No heaving lifting of more than 5 lbs/or as directed per your physician       Diet    Increase Fluid/Water intake, eat foods high in fiber; fruits and vegetables to help to prevent constipation    Examples of foods high in fiber    Vegetables      All vegetables, especially asparagus, bean sprouts, broccoli, [place]  sprouts, cabbage, carrots, cauliflower, celery, corn, greens, green beans,  green  pepper, onions, peas, potatoes (with skin), snow peas, spinach,  squash,  sweet potatoes, tomatoes, zucchini      For maximum fiber intake, eat the peels of fruits and vegetables just be sure  to wash them well first.     Fruits    All fruits, especially apples, berries, grapefruits, mangoes, nectarines,  oranges, peaches, pears, dried fruits (figs, dates, prunes, raisins)      Choose raw fruits and vegetables over juice, cooked, or canned raw fruit  has  more fiber. Dried fruit is also a good source of fiber.       Some of the common symptoms of a surgical site infection are:    Symptoms in the area where the surgery took place:     Redness   Drainage   Pus   Pain   Swelling   Bad smell     Prevention of surgical site infection:    Make sure that your healthcare providers clean their hands before examining you - either with soap and water  you take.  How do you empty the bulb of a Vu-Love drain?  Follow any instructions your doctor gives you. How often you empty the bulb depends on how much fluid is draining. Empty the bulb when it is half full.  Wash your hands with soap and water.  Take the plug out of the bulb.  Empty the bulb.   If your doctor asks you to measure the fluid, empty the fluid into a measuring cup, and write down the color and how much you collected. Your doctor will want to know this information.  Clean the plug with alcohol.  Squeeze the bulb until it is flat.   This removes all the air from the bulb. You may need to put the bulb on a table or a counter to flatten it.  Keep the bulb flat, and put the plug in.   The bulb should stay flat after you put the plug back in. This creates the suction that pulls the fluid into the bulb.  Empty the fluid into the toilet.  Wash your hands.  How do you change the dressing around your surgical drain?  You may have a dressing (bandage). The dressing is often made of gauze pads held on with tape. Your doctor will tell you how often to change it.  Wash your hands with soap and water.  Take off the dressing from around the drain.  Clean the drain site and the skin around it with soap and water.   Use gauze or a cotton swab.  When the site is dry, put on a new dressing.   The way your dressing is put on depends on what kind of drain you have. You will get instructions for your type of drain.  Wash your hands again with soap and water.  Your doctor may ask you to keep track of your dressing changes. Write down the time of day and the amount and color of the fluid on the dressing.  How do you help prevent clogs in your surgical drain?  Squeezing or \"milking\" the tube of your surgical drain can help prevent clogs so that it drains correctly. Your doctor will tell you when you need to do this. In general, you do this when:  You see a clot in the tube that prevents fluid from draining. The clot may

## 2024-03-21 NOTE — CARE COORDINATION
3/21/24, 8:02 AM EDT    Patient goals/plan/ treatment preferences discussed by  and .  Patient goals/plan/ treatment preferences reviewed with patient/ family.  Patient/ family verbalize understanding of discharge plan and are in agreement with goal/plan/treatment preferences.  Understanding was demonstrated using the teach back method.  AVS provided by RN at time of discharge, which includes all necessary medical information pertaining to the patients current course of illness, treatment, post-discharge goals of care, and treatment preferences.     Home with wife later today after has BM; plans new [facility] for drain mgmt. RW ordered.      Services At/After Discharge: Home Health and Nursing service

## 2024-03-21 NOTE — PLAN OF CARE
Problem: Discharge Planning  Goal: Discharge to home or other facility with appropriate resources  3/20/2024 2141 by Sheri Rodriguez RN  Outcome: Progressing  Flowsheets (Taken 3/20/2024 2141)  Discharge to home or other facility with appropriate resources:   Identify barriers to discharge with patient and caregiver   Arrange for needed discharge resources and transportation as appropriate   Identify discharge learning needs (meds, wound care, etc)     Problem: Pain  Goal: Verbalizes/displays adequate comfort level or baseline comfort level  3/20/2024 2141 by Sheri Rodriguez RN  Outcome: Progressing  Flowsheets (Taken 3/20/2024 2141)  Verbalizes/displays adequate comfort level or baseline comfort level:   Encourage patient to monitor pain and request assistance   Assess pain using appropriate pain scale   Administer analgesics based on type and severity of pain and evaluate response   Implement non-pharmacological measures as appropriate and evaluate response     Problem: ABCDS Injury Assessment  Goal: Absence of physical injury  3/20/2024 2141 by Sheri Rodriguez RN  Outcome: Progressing  Flowsheets  Taken 3/20/2024 2141 by Sheri Rodriguez RN  Absence of Physical Injury: Implement safety measures based on patient assessment    Problem: Safety - Adult  Goal: Free from fall injury  3/20/2024 2141 by Sheri Rodriguez RN  Outcome: Progressing  Flowsheets  Taken 3/20/2024 2141 by Sheri Rodriguez RN  Free From Fall Injury: Instruct family/caregiver on patient safety    Problem: Skin/Tissue Integrity  Goal: Absence of new skin breakdown  Description: 1.  Monitor for areas of redness and/or skin breakdown  2.  Assess vascular access sites hourly  3.  Every 4-6 hours minimum:  Change oxygen saturation probe site  4.  Every 4-6 hours:  If on nasal continuous positive airway pressure, respiratory therapy assess nares and determine need for appliance change or resting period.  3/20/2024 2141 by Sheri Rodriguez RN  Outcome:

## (undated) DEVICE — BIPOLAR SEALER 23-112-1 AQM 6.0: Brand: AQUAMANTYS ®

## (undated) DEVICE — Device

## (undated) DEVICE — GLOVE SURG SZ 65 THK91MIL LTX FREE SYN POLYISOPRENE

## (undated) DEVICE — PACK-MAJOR

## (undated) DEVICE — TOTAL TRAY, DB, 100% SILI FOLEY, 16FR 10: Brand: MEDLINE

## (undated) DEVICE — GOWN,SIRUS,NON REINFRCD,LARGE,SET IN SL: Brand: MEDLINE

## (undated) DEVICE — DRESSING TRNSPAR W8XL12IN FLM SURESITE 123

## (undated) DEVICE — DRAIN SURG 10FR PVC TB W/ TRCR MID PERF NO RESVR HUBLESS

## (undated) DEVICE — THE MILL DISPOSABLE - MEDIUM

## (undated) DEVICE — GLOVE SURG SZ 85 L12IN FNGR ORTHO 126MIL CRM LTX FREE

## (undated) DEVICE — KIT EVAC 400CC PVC RADPQ Y CONN

## (undated) DEVICE — BLADE ES L4IN INSUL EDGE

## (undated) DEVICE — PAD,NON-ADHERENT,3X8,STERILE,LF,1/PK: Brand: MEDLINE

## (undated) DEVICE — SPK10281 JACKSON TABLE KIT: Brand: SPK10281 JACKSON TABLE KIT

## (undated) DEVICE — 4.0MM PRECISION ROUND

## (undated) DEVICE — DRESSING TRNSPAR W4XL10IN FLM MIC POR SURESITE 123

## (undated) DEVICE — GLOVE ORANGE PI 7 1/2   MSG9075

## (undated) DEVICE — PROBE STIM 3 MM FOR PEDCL SCREW DISP

## (undated) DEVICE — JCKSON TBL POSTNER NO HD REST: Brand: MEDLINE INDUSTRIES, INC.

## (undated) DEVICE — GAUZE,SPONGE,4"X4",12PLY,STERILE,LF,2'S: Brand: MEDLINE

## (undated) DEVICE — AGENT HEMOSTATIC SURGIFLOW MATRIX KIT W/THROMBIN

## (undated) DEVICE — CODMAN® SURGICAL PATTIES 1" X 1" (2.54CM X 2.54CM): Brand: CODMAN®

## (undated) DEVICE — HEAD POSITIONER, SURGICAL: Brand: DEROYAL

## (undated) DEVICE — BASIC SINGLE BASIN BTC-LF: Brand: MEDLINE INDUSTRIES, INC.

## (undated) DEVICE — CLEANSER WND CLN DEB SYS IRRISEPT

## (undated) DEVICE — 1LYRTR 16FR10ML100%SILTMPS SNP: Brand: MEDLINE INDUSTRIES, INC.

## (undated) DEVICE — CELL SAVER TUBING

## (undated) DEVICE — GLOVE ORANGE PI 8   MSG9080

## (undated) DEVICE — TUBING, SUCTION, 1/4" X 20', STRAIGHT: Brand: MEDLINE INDUSTRIES, INC.

## (undated) DEVICE — DRESSING ANITMICROBIAL FOAM OPTIFOAM POSTOP STRP 35 X 10 IN

## (undated) DEVICE — GLOVE SURG SZ 65 L12IN THK75MIL DK GRN LTX FREE

## (undated) DEVICE — GLOVE ORANGE PI 7   MSG9070

## (undated) DEVICE — PENCIL SMK EVAC ALL IN 1 DSGN ENH VISIBILITY IMPROVED AIR

## (undated) DEVICE — SUTURE VCRL SZ 1 L18IN ABSRB VLT CTX L48MM 1/2 CIR J765D

## (undated) DEVICE — DRESSING TRNSPAR W5XL4.5IN FLM SHT SEMIPERMEABLE WIND

## (undated) DEVICE — CELL SAVER PACK